# Patient Record
Sex: MALE | Race: WHITE | URBAN - METROPOLITAN AREA
[De-identification: names, ages, dates, MRNs, and addresses within clinical notes are randomized per-mention and may not be internally consistent; named-entity substitution may affect disease eponyms.]

---

## 2024-03-29 ENCOUNTER — OFFICE VISIT (OUTPATIENT)
Age: 44
End: 2024-03-29
Payer: COMMERCIAL

## 2024-03-29 VITALS
HEART RATE: 68 BPM | HEIGHT: 68 IN | OXYGEN SATURATION: 94 % | DIASTOLIC BLOOD PRESSURE: 82 MMHG | SYSTOLIC BLOOD PRESSURE: 136 MMHG | BODY MASS INDEX: 36.53 KG/M2 | WEIGHT: 241 LBS

## 2024-03-29 DIAGNOSIS — H61.93 EARLOBE LESION, BILATERAL: ICD-10-CM

## 2024-03-29 DIAGNOSIS — G47.33 OSA (OBSTRUCTIVE SLEEP APNEA): Primary | ICD-10-CM

## 2024-03-29 DIAGNOSIS — R09.81 NASAL CONGESTION: ICD-10-CM

## 2024-03-29 DIAGNOSIS — J30.9 ALLERGIC RHINITIS, UNSPECIFIED SEASONALITY, UNSPECIFIED TRIGGER: ICD-10-CM

## 2024-03-29 PROCEDURE — 99204 OFFICE O/P NEW MOD 45 MIN: CPT | Performed by: STUDENT IN AN ORGANIZED HEALTH CARE EDUCATION/TRAINING PROGRAM

## 2024-03-29 RX ORDER — TIRZEPATIDE 2.5 MG/.5ML
INJECTION, SOLUTION SUBCUTANEOUS
COMMUNITY
Start: 2024-02-24

## 2024-03-29 RX ORDER — AZELASTINE 1 MG/ML
1 SPRAY, METERED NASAL 2 TIMES DAILY
Qty: 60 ML | Refills: 1 | Status: SHIPPED | OUTPATIENT
Start: 2024-03-29

## 2024-03-29 RX ORDER — NEBIVOLOL 10 MG/1
10 TABLET ORAL DAILY
COMMUNITY
Start: 2024-02-14

## 2024-03-29 RX ORDER — CITALOPRAM 20 MG/1
20 TABLET ORAL DAILY
COMMUNITY

## 2024-03-29 RX ORDER — CARIPRAZINE 3 MG/1
3 CAPSULE, GELATIN COATED ORAL DAILY
COMMUNITY
Start: 2024-03-22

## 2024-03-29 RX ORDER — FLUTICASONE PROPIONATE 50 MCG
2 SPRAY, SUSPENSION (ML) NASAL 2 TIMES DAILY
Qty: 16 G | Refills: 3 | Status: SHIPPED | OUTPATIENT
Start: 2024-03-29

## 2024-03-29 RX ORDER — ALBUTEROL SULFATE 2.5 MG/3ML
2.5 SOLUTION RESPIRATORY (INHALATION) EVERY 4 HOURS PRN
COMMUNITY
Start: 2018-01-08

## 2024-03-29 RX ORDER — LOSARTAN POTASSIUM AND HYDROCHLOROTHIAZIDE 12.5; 5 MG/1; MG/1
1 TABLET ORAL DAILY
COMMUNITY
Start: 2022-02-09

## 2024-03-29 RX ORDER — ROSUVASTATIN CALCIUM 5 MG/1
5 TABLET, COATED ORAL DAILY
COMMUNITY
Start: 2024-02-14

## 2024-03-29 RX ORDER — ONDANSETRON 4 MG/1
TABLET, ORALLY DISINTEGRATING ORAL
COMMUNITY
Start: 2024-02-24

## 2024-03-29 RX ORDER — FLUTICASONE PROPIONATE 50 MCG
1 SPRAY, SUSPENSION (ML) NASAL 2 TIMES DAILY
COMMUNITY
Start: 2021-12-20 | End: 2024-03-29

## 2024-03-29 RX ORDER — BUDESONIDE AND FORMOTEROL FUMARATE DIHYDRATE 160; 4.5 UG/1; UG/1
2 AEROSOL RESPIRATORY (INHALATION) 2 TIMES DAILY
COMMUNITY
Start: 2022-03-07

## 2024-03-29 RX ORDER — TRAZODONE HYDROCHLORIDE 50 MG/1
1 TABLET ORAL
COMMUNITY
Start: 2018-02-16

## 2024-03-29 RX ORDER — METFORMIN HYDROCHLORIDE 500 MG/1
500 TABLET, EXTENDED RELEASE ORAL DAILY
COMMUNITY
Start: 2024-02-24

## 2024-03-29 RX ORDER — AMLODIPINE AND OLMESARTAN MEDOXOMIL 5; 40 MG/1; MG/1
1 TABLET ORAL DAILY
COMMUNITY
Start: 2024-02-14

## 2024-03-29 RX ORDER — HYDROXYZINE PAMOATE 25 MG/1
25 CAPSULE ORAL NIGHTLY PRN
COMMUNITY
Start: 2018-01-08

## 2024-03-29 RX ORDER — CLOTRIMAZOLE AND BETAMETHASONE DIPROPIONATE 10; .64 MG/G; MG/G
CREAM TOPICAL
COMMUNITY
Start: 2024-02-14

## 2024-03-29 NOTE — PROGRESS NOTES
Subjective:   Eleno Escobar   43 y.o.   1980     Refered by: Jose Dumont Iii, Aprn - Np  [address]  [place] [address]     New Patient Visit  Chief Compliant: HEATHER    History of Present Illness:  Eleno Escobar is a 43 y.o. male with past medical history of DM, HTN, HLD, who was referred from Jose Dumont NP, for evaluation of HEATHER.     Patient reports significant daytime fatigue, a.m. headaches, unrestful sleep.  Has tried using CPAP for several years without success.  Patient finds that he is claustrophobic and tends to remove the mask at night.  Does not tolerate it well.      Additional complaint of bilateral nasal congestion for many years now, has tried some nasal spray, he does not know which, without significant benefit.  Has some postnasal drip.  Physical exam reveals septal deviation and inferior turbinate hypertrophy.    Lastly complaining of bilateral ear itching and scabbing.  Patient says that he has some more symptoms in his groin.  Has been using various steroid creams as well as antifungal creams without any significant improvement.     Primary care and ED notes reviewed    Review of Systems  Consitutional: denies fever, excessive weight gain or loss.  Eyes: denies diplopia, eye pain.  Integumentary: denies new concerning skin lesions.  Ears, Nose, Mouth, Throat: denies except as per HPI.  Endocrine: denies hot or cold intolerance, increased thirst.  Respiratory: denies cough, hemoptysis, wheezing  Gastrointestinal: denies trouble swallowing, nausea, emesis, regurgitation  Musculoskeletal: denies muscle weakness or wasting  Cardiovascular: denies chest pain, shortness of breath  Neurologic: denies seizures, numbness or tingling, syncope  Hematologic: denies easy bleeding or bruising       Past Medical History:   Diagnosis Date    Diabetes mellitus (HCC)     Glaucoma     High cholesterol     Hypertension      History reviewed. No pertinent surgical history.

## 2024-05-30 ENCOUNTER — OFFICE VISIT (OUTPATIENT)
Age: 44
End: 2024-05-30
Payer: COMMERCIAL

## 2024-05-30 VITALS
HEIGHT: 68 IN | OXYGEN SATURATION: 98 % | DIASTOLIC BLOOD PRESSURE: 98 MMHG | WEIGHT: 231 LBS | BODY MASS INDEX: 35.01 KG/M2 | HEART RATE: 83 BPM | SYSTOLIC BLOOD PRESSURE: 162 MMHG

## 2024-05-30 DIAGNOSIS — J34.2 NASAL SEPTAL DEVIATION: ICD-10-CM

## 2024-05-30 DIAGNOSIS — G47.33 OSA (OBSTRUCTIVE SLEEP APNEA): Primary | ICD-10-CM

## 2024-05-30 DIAGNOSIS — R09.81 NASAL CONGESTION: ICD-10-CM

## 2024-05-30 DIAGNOSIS — H61.93 EARLOBE LESION, BILATERAL: ICD-10-CM

## 2024-05-30 DIAGNOSIS — J34.3 HYPERTROPHY OF INFERIOR NASAL TURBINATE: ICD-10-CM

## 2024-05-30 DIAGNOSIS — R09.82 POST-NASAL DRIP: ICD-10-CM

## 2024-05-30 PROCEDURE — 99214 OFFICE O/P EST MOD 30 MIN: CPT | Performed by: STUDENT IN AN ORGANIZED HEALTH CARE EDUCATION/TRAINING PROGRAM

## 2024-05-30 RX ORDER — FLUTICASONE PROPIONATE 50 MCG
2 SPRAY, SUSPENSION (ML) NASAL 2 TIMES DAILY
Qty: 16 G | Refills: 3 | Status: SHIPPED | OUTPATIENT
Start: 2024-05-30

## 2024-05-30 RX ORDER — AZELASTINE 1 MG/ML
1 SPRAY, METERED NASAL 2 TIMES DAILY
Qty: 60 ML | Refills: 3 | Status: SHIPPED | OUTPATIENT
Start: 2024-05-30

## 2024-05-30 RX ORDER — DULAGLUTIDE 0.75 MG/.5ML
INJECTION, SOLUTION SUBCUTANEOUS
COMMUNITY
Start: 2024-05-28

## 2024-05-30 NOTE — PROGRESS NOTES
Subjective:   Eleno Escobar   43 y.o.   1980     Refered by: No referring provider defined for this encounter.     New Patient Visit  Chief Compliant: HEATHER    History of Present Illness:  Eleno Escobar is a 43 y.o. male with past medical history of DM, HTN, HLD, who was referred from Jose Dumont NP, for evaluation of HEATHER.     Patient reports significant daytime fatigue, a.m. headaches, unrestful sleep.  Has tried using CPAP for several years without success.  Patient finds that he is claustrophobic and tends to remove the mask at night.  Does not tolerate it well.      Additional complaint of bilateral nasal congestion for many years now, has tried some nasal spray, he does not know which, without significant benefit.  Has some postnasal drip.  Physical exam reveals septal deviation and inferior turbinate hypertrophy.    Lastly complaining of bilateral ear itching and scabbing.  Patient says that he has some more symptoms in his groin.  Has been using various steroid creams as well as antifungal creams without any significant improvement.     Primary care and ED notes reviewed    Interval Hx:   5/30/24:   Sleep study (4/18/24) reviewed and sleep medicine notes reviewed:   AHI = 31.2 (4%), 46.5 (3%), central/mixed apneas 38%, O2 vladislav - unable to be assessed, BMI = 35.12  Sleep study was not of the highest quality due to patient removing leads during the night.    Patient was unable to see [facility], would like a referral to another Derm practice.      Patient's nasal drainage improved with Flonase and azelastine, however congestion is persistent especially overnight.    Review of Systems  Consitutional: denies fever, excessive weight gain or loss.  Eyes: denies diplopia, eye pain.  Integumentary: denies new concerning skin lesions.  Ears, Nose, Mouth, Throat: denies except as per HPI.  Endocrine: denies hot or cold intolerance, increased thirst.  Respiratory: denies cough,

## 2024-12-22 ENCOUNTER — HOSPITAL ENCOUNTER (INPATIENT)
Facility: HOSPITAL | Age: 44
LOS: 3 days | Discharge: HOME | DRG: 871 | End: 2024-12-25
Attending: STUDENT IN AN ORGANIZED HEALTH CARE EDUCATION/TRAINING PROGRAM
Payer: MEDICARE

## 2024-12-22 ENCOUNTER — APPOINTMENT (OUTPATIENT)
Facility: HOSPITAL | Age: 44
DRG: 871 | End: 2024-12-22
Payer: MEDICARE

## 2024-12-22 DIAGNOSIS — E87.20 LACTIC ACIDOSIS: ICD-10-CM

## 2024-12-22 DIAGNOSIS — H91.93 BILATERAL HEARING LOSS, UNSPECIFIED HEARING LOSS TYPE: ICD-10-CM

## 2024-12-22 DIAGNOSIS — R65.21 SEPTIC SHOCK (HCC): Primary | ICD-10-CM

## 2024-12-22 DIAGNOSIS — A41.9 SEPTIC SHOCK (HCC): Primary | ICD-10-CM

## 2024-12-22 DIAGNOSIS — R40.0 SOMNOLENCE: ICD-10-CM

## 2024-12-22 LAB
ALBUMIN SERPL-MCNC: 4 G/DL (ref 3.5–5)
ALBUMIN/GLOB SERPL: 1.2 (ref 1.1–2.2)
ALP SERPL-CCNC: 88 U/L (ref 45–117)
ALT SERPL-CCNC: 1205 U/L (ref 12–78)
ANION GAP SERPL CALC-SCNC: 18 MMOL/L (ref 2–12)
ANION GAP SERPL CALC-SCNC: 8 MMOL/L (ref 2–12)
APAP SERPL-MCNC: <2 UG/ML (ref 10–30)
ARTERIAL PATENCY WRIST A: YES
AST SERPL W P-5'-P-CCNC: 1215 U/L (ref 15–37)
BASE DEFICIT BLD-SCNC: 18.5 MMOL/L
BASE DEFICIT BLD-SCNC: 5.9 MMOL/L
BASE DEFICIT BLDA-SCNC: 21.4 MMOL/L
BASE DEFICIT BLDV-SCNC: 9.8 MMOL/L
BASOPHILS # BLD: 0 K/UL (ref 0–0.1)
BASOPHILS NFR BLD: 0 % (ref 0–1)
BDY SITE: ABNORMAL
BDY SITE: ABNORMAL
BILIRUB SERPL-MCNC: 0.4 MG/DL (ref 0.2–1)
BNP SERPL-MCNC: 84 PG/ML
BODY TEMPERATURE: 97
BODY TEMPERATURE: 98.6
BUN SERPL-MCNC: 29 MG/DL (ref 6–20)
BUN SERPL-MCNC: 30 MG/DL (ref 6–20)
BUN/CREAT SERPL: 10 (ref 12–20)
BUN/CREAT SERPL: 12 (ref 12–20)
CA-I BLD-MCNC: 1.07 MMOL/L (ref 1.12–1.32)
CA-I BLD-MCNC: 1.22 MMOL/L (ref 1.12–1.32)
CA-I BLD-MCNC: 6.7 MG/DL (ref 8.5–10.1)
CA-I BLD-MCNC: 9.4 MG/DL (ref 8.5–10.1)
CHLORIDE BLD-SCNC: 104 MMOL/L (ref 98–107)
CHLORIDE BLD-SCNC: 104 MMOL/L (ref 98–107)
CHLORIDE SERPL-SCNC: 100 MMOL/L (ref 97–108)
CHLORIDE SERPL-SCNC: 109 MMOL/L (ref 97–108)
CK SERPL-CCNC: 355 U/L (ref 39–308)
CO2 BLD-SCNC: 18 MMOL/L
CO2 BLD-SCNC: 25 MMOL/L
CO2 SERPL-SCNC: 18 MMOL/L (ref 21–32)
CO2 SERPL-SCNC: 24 MMOL/L (ref 21–32)
COHGB MFR BLD: 5.5 % (ref 1–2)
CREAT SERPL-MCNC: 2.54 MG/DL (ref 0.7–1.3)
CREAT SERPL-MCNC: 2.82 MG/DL (ref 0.7–1.3)
CREAT UR-MCNC: 2.18 MG/DL (ref 0.6–1.3)
CREAT UR-MCNC: 2.28 MG/DL (ref 0.6–1.3)
DATE LAST DOSE: ABNORMAL
DATE LAST DOSE: NORMAL
DIFFERENTIAL METHOD BLD: ABNORMAL
DOSE AMOUNT: ABNORMAL UNITS
DOSE AMOUNT: NORMAL UNITS
EOSINOPHIL # BLD: 0 K/UL (ref 0–0.4)
EOSINOPHIL NFR BLD: 0 % (ref 0–7)
ERYTHROCYTE [DISTWIDTH] IN BLOOD BY AUTOMATED COUNT: 11.9 % (ref 11.5–14.5)
FIO2 ON VENT: 21 %
FIO2 ON VENT: 36 %
FLUAV RNA SPEC QL NAA+PROBE: NOT DETECTED
FLUBV RNA SPEC QL NAA+PROBE: NOT DETECTED
GLOBULIN SER CALC-MCNC: 3.3 G/DL (ref 2–4)
GLUCOSE BLD STRIP.AUTO-MCNC: 239 MG/DL (ref 65–100)
GLUCOSE BLD STRIP.AUTO-MCNC: 255 MG/DL (ref 65–100)
GLUCOSE BLD STRIP.AUTO-MCNC: 320 MG/DL (ref 65–100)
GLUCOSE BLD STRIP.AUTO-MCNC: 409 MG/DL (ref 65–100)
GLUCOSE SERPL-MCNC: 253 MG/DL (ref 65–100)
GLUCOSE SERPL-MCNC: 386 MG/DL (ref 65–100)
HCO3 BLD-SCNC: 16.6 MMOL/L (ref 19–28)
HCO3 BLD-SCNC: 24.6 MMOL/L (ref 19–28)
HCO3 BLDA-SCNC: 12 MMOL/L (ref 22–26)
HCO3 BLDV-SCNC: 22 MMOL/L (ref 24–25)
HCT VFR BLD AUTO: 52.9 % (ref 36.6–50.3)
HGB BLD-MCNC: 15.8 G/DL (ref 12.1–17)
IMM GRANULOCYTES # BLD AUTO: 0 K/UL
IMM GRANULOCYTES NFR BLD AUTO: 0 %
LACTATE BLD-SCNC: 15.01 MMOL/L (ref 0.4–2)
LACTATE BLD-SCNC: 8.72 MMOL/L (ref 0.4–2)
LYMPHOCYTES # BLD: 2.5 K/UL (ref 0.8–3.5)
LYMPHOCYTES NFR BLD: 11 % (ref 12–49)
MAGNESIUM SERPL-MCNC: 3.3 MG/DL (ref 1.6–2.4)
MCH RBC QN AUTO: 30.6 PG (ref 26–34)
MCHC RBC AUTO-ENTMCNC: 29.9 G/DL (ref 30–36.5)
MCV RBC AUTO: 102.5 FL (ref 80–99)
METHGB MFR BLD: 0.2 % (ref 0–1.4)
MONOCYTES # BLD: 0.9 K/UL (ref 0–1)
MONOCYTES NFR BLD: 4 % (ref 5–13)
MRSA DNA SPEC QL NAA+PROBE: NOT DETECTED
NEUTS BAND NFR BLD MANUAL: 2 % (ref 0–6)
NEUTS SEG # BLD: 19.6 K/UL (ref 1.8–8)
NEUTS SEG NFR BLD: 83 % (ref 32–75)
NRBC # BLD: 0.02 K/UL (ref 0–0.01)
NRBC BLD-RTO: 0.1 PER 100 WBC
OXYHGB MFR BLD: 82 % (ref 95–99)
PCO2 BLD: 72.2 MMHG (ref 35–45)
PCO2 BLD: 84.5 MMHG (ref 35–45)
PCO2 BLDA: 57 MMHG (ref 35–45)
PCO2 BLDV: 73.7 MMHG (ref 41–51)
PERFORMED BY:: ABNORMAL
PH BLD: 6.9 (ref 7.35–7.45)
PH BLD: 7.14 (ref 7.35–7.45)
PH BLDA: 6.93 (ref 7.35–7.45)
PH BLDV: 7.09 (ref 7.32–7.42)
PLATELET # BLD AUTO: 208 K/UL (ref 150–400)
PMV BLD AUTO: 11.2 FL (ref 8.9–12.9)
PO2 BLD: 30 MMHG (ref 75–100)
PO2 BLD: <27 MMHG (ref 75–100)
PO2 BLDA: 62 MMHG (ref 80–100)
PO2 BLDV: 41 MMHG (ref 25–40)
POTASSIUM BLD-SCNC: 5 MMOL/L (ref 3.5–5.5)
POTASSIUM BLD-SCNC: 6.1 MMOL/L (ref 3.5–5.5)
POTASSIUM SERPL-SCNC: 5.1 MMOL/L (ref 3.5–5.1)
POTASSIUM SERPL-SCNC: 5.7 MMOL/L (ref 3.5–5.1)
POTASSIUM SERPL-SCNC: ABNORMAL MMOL/L (ref 3.5–5.1)
PROCALCITONIN SERPL-MCNC: 1.53 NG/ML
PROT SERPL-MCNC: 7.3 G/DL (ref 6.4–8.2)
RBC # BLD AUTO: 5.16 M/UL (ref 4.1–5.7)
RBC MORPH BLD: ABNORMAL
RSV BY NAA: NOT DETECTED
SALICYLATES SERPL-MCNC: 5.1 MG/DL (ref 2.8–20)
SAO2 % BLD: 39 %
SAO2 % BLD: 87 % (ref 95–99)
SAO2% DEVICE SAO2% SENSOR NAME: ABNORMAL
SAO2% DEVICE SAO2% SENSOR NAME: ABNORMAL
SARS-COV-2 RNA RESP QL NAA+PROBE: NOT DETECTED
SERVICE CMNT-IMP: ABNORMAL
SODIUM BLD-SCNC: 137 MMOL/L (ref 136–145)
SODIUM BLD-SCNC: 139 MMOL/L (ref 136–145)
SODIUM SERPL-SCNC: 136 MMOL/L (ref 136–145)
SODIUM SERPL-SCNC: 141 MMOL/L (ref 136–145)
SPECIMEN SITE: ABNORMAL
SPECIMEN SOURCE: NORMAL
TROPONIN I SERPL HS-MCNC: 33 NG/L (ref 0–76)
WBC # BLD AUTO: 23 K/UL (ref 4.1–11.1)

## 2024-12-22 PROCEDURE — 83880 ASSAY OF NATRIURETIC PEPTIDE: CPT

## 2024-12-22 PROCEDURE — 2580000003 HC RX 258: Performed by: STUDENT IN AN ORGANIZED HEALTH CARE EDUCATION/TRAINING PROGRAM

## 2024-12-22 PROCEDURE — 6370000000 HC RX 637 (ALT 250 FOR IP): Performed by: INTERNAL MEDICINE

## 2024-12-22 PROCEDURE — 99291 CRITICAL CARE FIRST HOUR: CPT

## 2024-12-22 PROCEDURE — 71275 CT ANGIOGRAPHY CHEST: CPT

## 2024-12-22 PROCEDURE — 6360000002 HC RX W HCPCS: Performed by: INTERNAL MEDICINE

## 2024-12-22 PROCEDURE — 6360000002 HC RX W HCPCS: Performed by: STUDENT IN AN ORGANIZED HEALTH CARE EDUCATION/TRAINING PROGRAM

## 2024-12-22 PROCEDURE — 74177 CT ABD & PELVIS W/CONTRAST: CPT

## 2024-12-22 PROCEDURE — 80143 DRUG ASSAY ACETAMINOPHEN: CPT

## 2024-12-22 PROCEDURE — 71045 X-RAY EXAM CHEST 1 VIEW: CPT

## 2024-12-22 PROCEDURE — 80179 DRUG ASSAY SALICYLATE: CPT

## 2024-12-22 PROCEDURE — 2580000003 HC RX 258: Performed by: INTERNAL MEDICINE

## 2024-12-22 PROCEDURE — 82947 ASSAY GLUCOSE BLOOD QUANT: CPT

## 2024-12-22 PROCEDURE — 70450 CT HEAD/BRAIN W/O DYE: CPT

## 2024-12-22 PROCEDURE — 84484 ASSAY OF TROPONIN QUANT: CPT

## 2024-12-22 PROCEDURE — 83605 ASSAY OF LACTIC ACID: CPT

## 2024-12-22 PROCEDURE — 84145 PROCALCITONIN (PCT): CPT

## 2024-12-22 PROCEDURE — 84295 ASSAY OF SERUM SODIUM: CPT

## 2024-12-22 PROCEDURE — 2500000003 HC RX 250 WO HCPCS: Performed by: NURSE PRACTITIONER

## 2024-12-22 PROCEDURE — 80048 BASIC METABOLIC PNL TOTAL CA: CPT

## 2024-12-22 PROCEDURE — 00JU3ZZ INSPECTION OF SPINAL CANAL, PERCUTANEOUS APPROACH: ICD-10-PCS | Performed by: NURSE PRACTITIONER

## 2024-12-22 PROCEDURE — 87636 SARSCOV2 & INF A&B AMP PRB: CPT

## 2024-12-22 PROCEDURE — 2500000003 HC RX 250 WO HCPCS: Performed by: STUDENT IN AN ORGANIZED HEALTH CARE EDUCATION/TRAINING PROGRAM

## 2024-12-22 PROCEDURE — 82962 GLUCOSE BLOOD TEST: CPT

## 2024-12-22 PROCEDURE — 2500000003 HC RX 250 WO HCPCS: Performed by: INTERNAL MEDICINE

## 2024-12-22 PROCEDURE — 82803 BLOOD GASES ANY COMBINATION: CPT

## 2024-12-22 PROCEDURE — 85025 COMPLETE CBC W/AUTO DIFF WBC: CPT

## 2024-12-22 PROCEDURE — 87040 BLOOD CULTURE FOR BACTERIA: CPT

## 2024-12-22 PROCEDURE — 94660 CPAP INITIATION&MGMT: CPT

## 2024-12-22 PROCEDURE — 83735 ASSAY OF MAGNESIUM: CPT

## 2024-12-22 PROCEDURE — 82330 ASSAY OF CALCIUM: CPT

## 2024-12-22 PROCEDURE — 82550 ASSAY OF CK (CPK): CPT

## 2024-12-22 PROCEDURE — 84132 ASSAY OF SERUM POTASSIUM: CPT

## 2024-12-22 PROCEDURE — 87641 MR-STAPH DNA AMP PROBE: CPT

## 2024-12-22 PROCEDURE — 94640 AIRWAY INHALATION TREATMENT: CPT

## 2024-12-22 PROCEDURE — 6360000002 HC RX W HCPCS: Performed by: NURSE PRACTITIONER

## 2024-12-22 PROCEDURE — 62270 DX LMBR SPI PNXR: CPT

## 2024-12-22 PROCEDURE — 6360000004 HC RX CONTRAST MEDICATION: Performed by: INTERNAL MEDICINE

## 2024-12-22 PROCEDURE — 2000000000 HC ICU R&B

## 2024-12-22 PROCEDURE — 2700000000 HC OXYGEN THERAPY PER DAY

## 2024-12-22 PROCEDURE — 36600 WITHDRAWAL OF ARTERIAL BLOOD: CPT

## 2024-12-22 PROCEDURE — 80053 COMPREHEN METABOLIC PANEL: CPT

## 2024-12-22 PROCEDURE — 93005 ELECTROCARDIOGRAM TRACING: CPT | Performed by: STUDENT IN AN ORGANIZED HEALTH CARE EDUCATION/TRAINING PROGRAM

## 2024-12-22 PROCEDURE — 009U3ZX DRAINAGE OF SPINAL CANAL, PERCUTANEOUS APPROACH, DIAGNOSTIC: ICD-10-PCS | Performed by: NURSE PRACTITIONER

## 2024-12-22 PROCEDURE — 96374 THER/PROPH/DIAG INJ IV PUSH: CPT

## 2024-12-22 PROCEDURE — 87634 RSV DNA/RNA AMP PROBE: CPT

## 2024-12-22 RX ORDER — 0.9 % SODIUM CHLORIDE 0.9 %
30 INTRAVENOUS SOLUTION INTRAVENOUS ONCE
Status: COMPLETED | OUTPATIENT
Start: 2024-12-22 | End: 2024-12-22

## 2024-12-22 RX ORDER — DEXMEDETOMIDINE HYDROCHLORIDE 4 UG/ML
.1-1.5 INJECTION, SOLUTION INTRAVENOUS CONTINUOUS
Status: DISCONTINUED | OUTPATIENT
Start: 2024-12-22 | End: 2024-12-23

## 2024-12-22 RX ORDER — IOPAMIDOL 755 MG/ML
100 INJECTION, SOLUTION INTRAVASCULAR
Status: COMPLETED | OUTPATIENT
Start: 2024-12-22 | End: 2024-12-22

## 2024-12-22 RX ORDER — BUDESONIDE 0.5 MG/2ML
0.5 INHALANT ORAL
Status: DISCONTINUED | OUTPATIENT
Start: 2024-12-22 | End: 2024-12-25 | Stop reason: HOSPADM

## 2024-12-22 RX ORDER — ENOXAPARIN SODIUM 100 MG/ML
30 INJECTION SUBCUTANEOUS 2 TIMES DAILY
Status: DISCONTINUED | OUTPATIENT
Start: 2024-12-22 | End: 2024-12-23

## 2024-12-22 RX ORDER — SODIUM CHLORIDE 0.9 % (FLUSH) 0.9 %
5-40 SYRINGE (ML) INJECTION EVERY 12 HOURS SCHEDULED
Status: DISCONTINUED | OUTPATIENT
Start: 2024-12-22 | End: 2024-12-25 | Stop reason: HOSPADM

## 2024-12-22 RX ORDER — 0.9 % SODIUM CHLORIDE 0.9 %
500 INTRAVENOUS SOLUTION INTRAVENOUS ONCE
Status: COMPLETED | OUTPATIENT
Start: 2024-12-22 | End: 2024-12-22

## 2024-12-22 RX ORDER — SODIUM CHLORIDE 9 MG/ML
INJECTION, SOLUTION INTRAVENOUS PRN
Status: DISCONTINUED | OUTPATIENT
Start: 2024-12-22 | End: 2024-12-25 | Stop reason: HOSPADM

## 2024-12-22 RX ORDER — CALCIUM GLUCONATE 94 MG/ML
1000 INJECTION, SOLUTION INTRAVENOUS ONCE
Status: COMPLETED | OUTPATIENT
Start: 2024-12-22 | End: 2024-12-22

## 2024-12-22 RX ORDER — SODIUM CHLORIDE 9 MG/ML
INJECTION, SOLUTION INTRAVENOUS PRN
Status: DISCONTINUED | OUTPATIENT
Start: 2024-12-22 | End: 2024-12-23 | Stop reason: SDUPTHER

## 2024-12-22 RX ORDER — IPRATROPIUM BROMIDE AND ALBUTEROL SULFATE 2.5; .5 MG/3ML; MG/3ML
1 SOLUTION RESPIRATORY (INHALATION)
Status: DISCONTINUED | OUTPATIENT
Start: 2024-12-22 | End: 2024-12-22

## 2024-12-22 RX ORDER — INSULIN LISPRO 100 [IU]/ML
0-8 INJECTION, SOLUTION INTRAVENOUS; SUBCUTANEOUS EVERY 6 HOURS SCHEDULED
Status: DISCONTINUED | OUTPATIENT
Start: 2024-12-22 | End: 2024-12-25 | Stop reason: HOSPADM

## 2024-12-22 RX ORDER — GLUCAGON 1 MG/ML
1 KIT INJECTION PRN
Status: DISCONTINUED | OUTPATIENT
Start: 2024-12-22 | End: 2024-12-23 | Stop reason: SDUPTHER

## 2024-12-22 RX ORDER — NOREPINEPHRINE BITARTRATE 0.06 MG/ML
1-100 INJECTION, SOLUTION INTRAVENOUS CONTINUOUS
Status: DISCONTINUED | OUTPATIENT
Start: 2024-12-22 | End: 2024-12-23

## 2024-12-22 RX ORDER — POTASSIUM CHLORIDE 7.45 MG/ML
10 INJECTION INTRAVENOUS PRN
Status: DISCONTINUED | OUTPATIENT
Start: 2024-12-22 | End: 2024-12-25 | Stop reason: HOSPADM

## 2024-12-22 RX ORDER — POLYETHYLENE GLYCOL 3350 17 G/17G
17 POWDER, FOR SOLUTION ORAL DAILY PRN
Status: DISCONTINUED | OUTPATIENT
Start: 2024-12-22 | End: 2024-12-25 | Stop reason: HOSPADM

## 2024-12-22 RX ORDER — SODIUM CHLORIDE 0.9 % (FLUSH) 0.9 %
5-40 SYRINGE (ML) INJECTION PRN
Status: DISCONTINUED | OUTPATIENT
Start: 2024-12-22 | End: 2024-12-25 | Stop reason: HOSPADM

## 2024-12-22 RX ORDER — IPRATROPIUM BROMIDE AND ALBUTEROL SULFATE 2.5; .5 MG/3ML; MG/3ML
1 SOLUTION RESPIRATORY (INHALATION)
Status: DISCONTINUED | OUTPATIENT
Start: 2024-12-22 | End: 2024-12-25 | Stop reason: HOSPADM

## 2024-12-22 RX ORDER — IPRATROPIUM BROMIDE AND ALBUTEROL SULFATE 2.5; .5 MG/3ML; MG/3ML
1 SOLUTION RESPIRATORY (INHALATION) EVERY 4 HOURS PRN
Status: DISCONTINUED | OUTPATIENT
Start: 2024-12-22 | End: 2024-12-25 | Stop reason: HOSPADM

## 2024-12-22 RX ORDER — ACETAMINOPHEN 650 MG/1
650 SUPPOSITORY RECTAL EVERY 6 HOURS PRN
Status: DISCONTINUED | OUTPATIENT
Start: 2024-12-22 | End: 2024-12-25 | Stop reason: HOSPADM

## 2024-12-22 RX ORDER — ONDANSETRON 2 MG/ML
4 INJECTION INTRAMUSCULAR; INTRAVENOUS EVERY 6 HOURS PRN
Status: DISCONTINUED | OUTPATIENT
Start: 2024-12-22 | End: 2024-12-25 | Stop reason: HOSPADM

## 2024-12-22 RX ORDER — DEXTROSE MONOHYDRATE 100 MG/ML
INJECTION, SOLUTION INTRAVENOUS CONTINUOUS PRN
Status: DISCONTINUED | OUTPATIENT
Start: 2024-12-22 | End: 2024-12-23 | Stop reason: SDUPTHER

## 2024-12-22 RX ORDER — ONDANSETRON 4 MG/1
4 TABLET, ORALLY DISINTEGRATING ORAL EVERY 8 HOURS PRN
Status: DISCONTINUED | OUTPATIENT
Start: 2024-12-22 | End: 2024-12-25 | Stop reason: HOSPADM

## 2024-12-22 RX ORDER — ACETAMINOPHEN 325 MG/1
650 TABLET ORAL EVERY 6 HOURS PRN
Status: DISCONTINUED | OUTPATIENT
Start: 2024-12-22 | End: 2024-12-25 | Stop reason: HOSPADM

## 2024-12-22 RX ORDER — MAGNESIUM SULFATE IN WATER 40 MG/ML
2000 INJECTION, SOLUTION INTRAVENOUS PRN
Status: DISCONTINUED | OUTPATIENT
Start: 2024-12-22 | End: 2024-12-25 | Stop reason: HOSPADM

## 2024-12-22 RX ORDER — POTASSIUM CHLORIDE 29.8 MG/ML
20 INJECTION INTRAVENOUS PRN
Status: DISCONTINUED | OUTPATIENT
Start: 2024-12-22 | End: 2024-12-25 | Stop reason: HOSPADM

## 2024-12-22 RX ORDER — SODIUM CHLORIDE 0.9 % (FLUSH) 0.9 %
5-40 SYRINGE (ML) INJECTION EVERY 12 HOURS SCHEDULED
Status: DISCONTINUED | OUTPATIENT
Start: 2024-12-22 | End: 2024-12-23 | Stop reason: SDUPTHER

## 2024-12-22 RX ORDER — SODIUM CHLORIDE 0.9 % (FLUSH) 0.9 %
5-40 SYRINGE (ML) INJECTION PRN
Status: DISCONTINUED | OUTPATIENT
Start: 2024-12-22 | End: 2024-12-23 | Stop reason: SDUPTHER

## 2024-12-22 RX ADMIN — VANCOMYCIN HYDROCHLORIDE 2500 MG: 1.25 INJECTION, POWDER, LYOPHILIZED, FOR SOLUTION INTRAVENOUS at 15:59

## 2024-12-22 RX ADMIN — SODIUM BICARBONATE 50 MEQ: 84 INJECTION, SOLUTION INTRAVENOUS at 18:58

## 2024-12-22 RX ADMIN — SODIUM CHLORIDE, PRESERVATIVE FREE 10 ML: 5 INJECTION INTRAVENOUS at 16:19

## 2024-12-22 RX ADMIN — SODIUM CHLORIDE 3144 ML: 9 INJECTION, SOLUTION INTRAVENOUS at 15:10

## 2024-12-22 RX ADMIN — INSULIN LISPRO 4 UNITS: 100 INJECTION, SOLUTION INTRAVENOUS; SUBCUTANEOUS at 18:58

## 2024-12-22 RX ADMIN — SODIUM CHLORIDE, PRESERVATIVE FREE 10 ML: 5 INJECTION INTRAVENOUS at 20:51

## 2024-12-22 RX ADMIN — WATER 2000 MG: 1 INJECTION INTRAMUSCULAR; INTRAVENOUS; SUBCUTANEOUS at 15:00

## 2024-12-22 RX ADMIN — Medication 5 MCG/MIN: at 15:58

## 2024-12-22 RX ADMIN — CALCIUM GLUCONATE 1000 MG: 98 INJECTION, SOLUTION INTRAVENOUS at 23:38

## 2024-12-22 RX ADMIN — SODIUM CHLORIDE, PRESERVATIVE FREE 10 ML: 5 INJECTION INTRAVENOUS at 20:50

## 2024-12-22 RX ADMIN — IPRATROPIUM BROMIDE AND ALBUTEROL SULFATE 1 DOSE: 2.5; .5 SOLUTION RESPIRATORY (INHALATION) at 22:01

## 2024-12-22 RX ADMIN — SODIUM CHLORIDE 500 ML: 9 INJECTION, SOLUTION INTRAVENOUS at 19:13

## 2024-12-22 RX ADMIN — WATER 2000 MG: 1 INJECTION INTRAMUSCULAR; INTRAVENOUS; SUBCUTANEOUS at 19:21

## 2024-12-22 RX ADMIN — SODIUM BICARBONATE 100 MEQ: 84 INJECTION, SOLUTION INTRAVENOUS at 16:33

## 2024-12-22 RX ADMIN — ENOXAPARIN SODIUM 30 MG: 100 INJECTION SUBCUTANEOUS at 20:50

## 2024-12-22 RX ADMIN — DEXMEDETOMIDINE HYDROCHLORIDE 1 MCG/KG/HR: 4 INJECTION, SOLUTION INTRAVENOUS at 21:42

## 2024-12-22 RX ADMIN — SODIUM BICARBONATE: 84 INJECTION, SOLUTION INTRAVENOUS at 19:39

## 2024-12-22 RX ADMIN — BUDESONIDE 500 MCG: 0.5 SUSPENSION RESPIRATORY (INHALATION) at 22:01

## 2024-12-22 RX ADMIN — IOPAMIDOL 100 ML: 755 INJECTION, SOLUTION INTRAVENOUS at 18:06

## 2024-12-22 ASSESSMENT — PAIN SCALES - GENERAL
PAINLEVEL_OUTOF10: 0

## 2024-12-22 ASSESSMENT — PAIN - FUNCTIONAL ASSESSMENT
PAIN_FUNCTIONAL_ASSESSMENT: NONE - DENIES PAIN
PAIN_FUNCTIONAL_ASSESSMENT: NONE - DENIES PAIN

## 2024-12-22 ASSESSMENT — LIFESTYLE VARIABLES
HOW MANY STANDARD DRINKS CONTAINING ALCOHOL DO YOU HAVE ON A TYPICAL DAY: PATIENT DOES NOT DRINK
HOW OFTEN DO YOU HAVE A DRINK CONTAINING ALCOHOL: NEVER

## 2024-12-22 NOTE — RT PROTOCOL NOTE
RT Inhaler-Nebulizer Bronchodilator Protocol Note    There is a bronchodilator order in the chart from a provider indicating to follow the RT Bronchodilator Protocol and there is an “Initiate RT Inhaler-Nebulizer Bronchodilator Protocol” order as well (see protocol at bottom of note).    CXR Findings:  XR CHEST PORTABLE    Result Date: 12/22/2024  No acute cardiopulmonary disease.  Electronically signed by Refugio Thurman MD      The findings from the last RT Protocol Assessment were as follows:   History Pulmonary Disease: Chronic pulmonary disease  Respiratory Pattern: Regular pattern and RR 12-20 bpm  Breath Sounds: Slightly diminished and/or crackles  Cough: Strong, spontaneous, non-productive  Indication for Bronchodilator Therapy:    Bronchodilator Assessment Score: 4    Aerosolized bronchodilator medication orders have been revised according to the RT Inhaler-Nebulizer Bronchodilator Protocol below.    Respiratory Therapist to perform RT Therapy Protocol Assessment initially then follow the protocol.  Repeat RT Therapy Protocol Assessment PRN for score 0-3 or on second treatment, BID, and PRN for scores above 3.    No Indications - adjust the frequency to every 6 hours PRN wheezing or bronchospasm, if no treatments needed after 48 hours then discontinue using Per Protocol order mode.     If indication present, adjust the RT bronchodilator orders based on the Bronchodilator Assessment Score as indicated below.  Use Inhaler orders unless patient has one or more of the following: on home nebulizer, not able to hold breath for 10 seconds, is not alert and oriented, cannot activate and use MDI correctly, or respiratory rate 25 breaths per minute or more, then use the equivalent nebulizer order(s) with same Frequency and PRN reasons based on the score.  If a patient is on this medication at home then do not decrease Frequency below that used at home.    0-3 - enter or revise RT bronchodilator order(s) to equivalent RT

## 2024-12-22 NOTE — ED TRIAGE NOTES
AMS reported after waking up from a 12-13 hour sleep. Pt was sleeping with his girlfriend in front of a wall AC unit during his sleep. EMS reported pt was confused and possibly hypothermic. Blood glucose was 305- pt is a reported diabetic. Pt did not respond to substance use.

## 2024-12-22 NOTE — PROGRESS NOTES
Cefepime Extended-Infusion Dosing/Monitoring  Current regimen:  1 g every 8 hours    Recent Labs     12/22/24  1438 12/22/24  1440 12/22/24  1652   CREATININE 2.82* 2.18* 2.28*   BUN 29*  --   --        Estimated CrCl:  49 mL/min    Plan: Change to Cefepime 2g q12h per [facility] P&T Committee Protocol with respect to intermittent-infusion ?-lactam antibiotics and fluid conservation protocol due to [place] shortage.   Primary team to keep in mind the below table and adjust dose based on improving renal function throughout inpatient stay.

## 2024-12-22 NOTE — H&P
procedural time which has been billed separately.    Tremaine Harris DO  [facility]  12/22/2024

## 2024-12-22 NOTE — PROGRESS NOTES
Vancomycin Dosing Consult  Eleno Escobar is a 44 y.o. male with sepsis. Pharmacy was consulted by Dr. Harris to dose and monitor vancomycin. Today is day 1.    Antibiotic regimen: Vancomycin + Cefepime    Temp (24hrs), Av.9 °F (33.8 °C), Min:91.5 °F (33.1 °C), Max:94.2 °F (34.6 °C)    Recent Labs     24  1438 24  1440 24  1652   WBC 23.0*  --   --    CREATININE 2.82* 2.18* 2.28*   BUN 29*  --   --      Est CrCl: 49 mL/min  Concomitant nephrotoxic drugs: Vasopressors    Cultures:    Blood x 2: pending    MRSA Swab: Pending    Target range: Re-dose for random level <15 mcg/mL    Recent level history:  Date/Time Dose & Interval Measured Level (mcg/mL) Associated AUC/GUILLE              Assessment/Plan:   LD Vancomycin IV 2500 mg x 1 given  Scr appears elevated. Unable to determine baseline Scr, will pulse dose for now.  Level scheduled for  @0800  CMP ordered through   Antimicrobial stop date 7 days per consult

## 2024-12-23 ENCOUNTER — APPOINTMENT (OUTPATIENT)
Facility: HOSPITAL | Age: 44
DRG: 871 | End: 2024-12-23
Payer: MEDICARE

## 2024-12-23 LAB
ALBUMIN SERPL-MCNC: 3.4 G/DL (ref 3.5–5)
ALBUMIN/GLOB SERPL: 1.4 (ref 1.1–2.2)
ALP SERPL-CCNC: 50 U/L (ref 45–117)
ALT SERPL-CCNC: 1267 U/L (ref 12–78)
AMPHET UR QL SCN: NEGATIVE
ANION GAP SERPL CALC-SCNC: 5 MMOL/L (ref 2–12)
ANION GAP SERPL CALC-SCNC: 5 MMOL/L (ref 2–12)
ANION GAP SERPL CALC-SCNC: 6 MMOL/L (ref 2–12)
APPEARANCE UR: ABNORMAL
ARTERIAL PATENCY WRIST A: ABNORMAL
ARTERIAL PATENCY WRIST A: ABNORMAL
AST SERPL W P-5'-P-CCNC: 1458 U/L (ref 15–37)
BACTERIA URNS QL MICRO: NEGATIVE /HPF
BARBITURATES UR QL SCN: NEGATIVE
BASE DEFICIT BLDA-SCNC: 2.1 MMOL/L
BASE DEFICIT BLDA-SCNC: 9.2 MMOL/L
BDY SITE: ABNORMAL
BDY SITE: ABNORMAL
BENZODIAZ UR QL: NEGATIVE
BILIRUB SERPL-MCNC: 0.4 MG/DL (ref 0.2–1)
BILIRUB UR QL: NEGATIVE
BODY TEMPERATURE: 101.2
BODY TEMPERATURE: 99.7
BUN SERPL-MCNC: 31 MG/DL (ref 6–20)
BUN SERPL-MCNC: 32 MG/DL (ref 6–20)
BUN SERPL-MCNC: 34 MG/DL (ref 6–20)
BUN/CREAT SERPL: 13 (ref 12–20)
BUN/CREAT SERPL: 16 (ref 12–20)
BUN/CREAT SERPL: 17 (ref 12–20)
CA-I BLD-MCNC: 1.01 MMOL/L (ref 1.13–1.32)
CA-I BLD-MCNC: 1.07 MMOL/L (ref 1.13–1.32)
CA-I BLD-MCNC: 6.6 MG/DL (ref 8.5–10.1)
CA-I BLD-MCNC: 7.5 MG/DL (ref 8.5–10.1)
CA-I BLD-MCNC: 8.1 MG/DL (ref 8.5–10.1)
CANNABINOIDS UR QL SCN: POSITIVE
CHLORIDE SERPL-SCNC: 106 MMOL/L (ref 97–108)
CHLORIDE SERPL-SCNC: 107 MMOL/L (ref 97–108)
CHLORIDE SERPL-SCNC: 109 MMOL/L (ref 97–108)
CO2 SERPL-SCNC: 23 MMOL/L (ref 21–32)
CO2 SERPL-SCNC: 26 MMOL/L (ref 21–32)
CO2 SERPL-SCNC: 29 MMOL/L (ref 21–32)
COCAINE UR QL SCN: POSITIVE
COHGB MFR BLD: 1.2 % (ref 1–2)
COHGB MFR BLD: 1.6 % (ref 1–2)
COLOR UR: ABNORMAL
CREAT SERPL-MCNC: 1.84 MG/DL (ref 0.7–1.3)
CREAT SERPL-MCNC: 2.05 MG/DL (ref 0.7–1.3)
CREAT SERPL-MCNC: 2.66 MG/DL (ref 0.7–1.3)
DATE LAST DOSE: NORMAL
DOSE AMOUNT: NORMAL UNITS
EKG ATRIAL RATE: 241 BPM
EKG DIAGNOSIS: NORMAL
EKG Q-T INTERVAL: 344 MS
EKG QRS DURATION: 84 MS
EKG QTC CALCULATION (BAZETT): 434 MS
EKG R AXIS: 73 DEGREES
EKG T AXIS: 1 DEGREES
EKG VENTRICULAR RATE: 96 BPM
EPITH CASTS URNS QL MICRO: ABNORMAL /LPF
ERYTHROCYTE [DISTWIDTH] IN BLOOD BY AUTOMATED COUNT: 12.4 % (ref 11.5–14.5)
EST. AVERAGE GLUCOSE BLD GHB EST-MCNC: 111 MG/DL
FIO2 ON VENT: 25 %
FIO2 ON VENT: 30 %
GLOBULIN SER CALC-MCNC: 2.5 G/DL (ref 2–4)
GLUCOSE BLD STRIP.AUTO-MCNC: 102 MG/DL (ref 65–100)
GLUCOSE BLD STRIP.AUTO-MCNC: 183 MG/DL (ref 65–100)
GLUCOSE BLD STRIP.AUTO-MCNC: 203 MG/DL (ref 65–100)
GLUCOSE BLD STRIP.AUTO-MCNC: 206 MG/DL (ref 65–100)
GLUCOSE BLD STRIP.AUTO-MCNC: 273 MG/DL (ref 65–100)
GLUCOSE BLD STRIP.AUTO-MCNC: 92 MG/DL (ref 65–100)
GLUCOSE BLD STRIP.AUTO-MCNC: 99 MG/DL (ref 65–100)
GLUCOSE SERPL-MCNC: 130 MG/DL (ref 65–100)
GLUCOSE SERPL-MCNC: 190 MG/DL (ref 65–100)
GLUCOSE SERPL-MCNC: 240 MG/DL (ref 65–100)
GLUCOSE UR STRIP.AUTO-MCNC: 50 MG/DL
HBA1C MFR BLD: 5.5 % (ref 4–5.6)
HCO3 BLDA-SCNC: 19 MMOL/L (ref 22–26)
HCO3 BLDA-SCNC: 25 MMOL/L (ref 22–26)
HCT VFR BLD AUTO: 44.5 % (ref 36.6–50.3)
HGB BLD-MCNC: 14.4 G/DL (ref 12.1–17)
HGB UR QL STRIP: ABNORMAL
HYALINE CASTS URNS QL MICRO: >20 /LPF (ref 0–5)
IPAP/PIP: 16
IPAP/PIP: 16
KETONES UR QL STRIP.AUTO: 5 MG/DL
LACTATE SERPL-SCNC: 1.7 MMOL/L (ref 0.4–2)
LACTATE SERPL-SCNC: 2.5 MMOL/L (ref 0.4–2)
LACTATE SERPL-SCNC: 2.9 MMOL/L (ref 0.4–2)
LACTATE SERPL-SCNC: 3.3 MMOL/L (ref 0.4–2)
LEUKOCYTE ESTERASE UR QL STRIP.AUTO: NEGATIVE
Lab: ABNORMAL
MAGNESIUM SERPL-MCNC: 2.1 MG/DL (ref 1.6–2.4)
MCH RBC QN AUTO: 30.4 PG (ref 26–34)
MCHC RBC AUTO-ENTMCNC: 32.4 G/DL (ref 30–36.5)
MCV RBC AUTO: 93.9 FL (ref 80–99)
METHADONE UR QL: NEGATIVE
METHGB MFR BLD: 0.3 % (ref 0–1.4)
METHGB MFR BLD: 0.3 % (ref 0–1.4)
MUCOUS THREADS URNS QL MICRO: ABNORMAL /LPF
NITRITE UR QL STRIP.AUTO: NEGATIVE
NRBC # BLD: 0 K/UL (ref 0–0.01)
NRBC BLD-RTO: 0 PER 100 WBC
OPIATES UR QL: NEGATIVE
OXYHGB MFR BLD: 94.9 % (ref 95–99)
OXYHGB MFR BLD: 95.1 % (ref 95–99)
PCO2 BLDA: 52 MMHG (ref 35–45)
PCO2 BLDA: 55 MMHG (ref 35–45)
PCP UR QL: NEGATIVE
PEEP RESPIRATORY: 6
PEEP RESPIRATORY: 6
PERFORMED BY:: ABNORMAL
PERFORMED BY:: NORMAL
PERFORMED BY:: NORMAL
PH BLDA: 7.19 (ref 7.35–7.45)
PH BLDA: 7.28 (ref 7.35–7.45)
PH UR STRIP: 5 (ref 5–8)
PHOSPHATE SERPL-MCNC: 5.4 MG/DL (ref 2.6–4.7)
PLATELET # BLD AUTO: 155 K/UL (ref 150–400)
PMV BLD AUTO: 12 FL (ref 8.9–12.9)
PO2 BLDA: 109 MMHG (ref 80–100)
PO2 BLDA: 96 MMHG (ref 80–100)
POTASSIUM SERPL-SCNC: 3.9 MMOL/L (ref 3.5–5.1)
POTASSIUM SERPL-SCNC: 4.2 MMOL/L (ref 3.5–5.1)
POTASSIUM SERPL-SCNC: 6.4 MMOL/L (ref 3.5–5.1)
PROT SERPL-MCNC: 5.9 G/DL (ref 6.4–8.2)
PROT UR STRIP-MCNC: 100 MG/DL
RBC # BLD AUTO: 4.74 M/UL (ref 4.1–5.7)
RBC #/AREA URNS HPF: ABNORMAL /HPF (ref 0–5)
SAO2 % BLD: 96 % (ref 95–99)
SAO2 % BLD: 97 % (ref 95–99)
SAO2% DEVICE SAO2% SENSOR NAME: ABNORMAL
SAO2% DEVICE SAO2% SENSOR NAME: ABNORMAL
SERVICE CMNT-IMP: ABNORMAL
SODIUM SERPL-SCNC: 138 MMOL/L (ref 136–145)
SODIUM SERPL-SCNC: 138 MMOL/L (ref 136–145)
SODIUM SERPL-SCNC: 140 MMOL/L (ref 136–145)
SP GR UR REFRACTOMETRY: >1.03 (ref 1–1.03)
SPECIMEN SITE: ABNORMAL
SPECIMEN SITE: ABNORMAL
URINE CULTURE IF INDICATED: ABNORMAL
UROBILINOGEN UR QL STRIP.AUTO: 0.1 EU/DL (ref 0.1–1)
VANCOMYCIN SERPL-MCNC: 20.8 UG/ML
WBC # BLD AUTO: 21.5 K/UL (ref 4.1–11.1)
WBC URNS QL MICRO: ABNORMAL /HPF (ref 0–4)

## 2024-12-23 PROCEDURE — 6360000002 HC RX W HCPCS: Performed by: NURSE PRACTITIONER

## 2024-12-23 PROCEDURE — 82962 GLUCOSE BLOOD TEST: CPT

## 2024-12-23 PROCEDURE — 84100 ASSAY OF PHOSPHORUS: CPT

## 2024-12-23 PROCEDURE — 2500000003 HC RX 250 WO HCPCS: Performed by: INTERNAL MEDICINE

## 2024-12-23 PROCEDURE — 2500000003 HC RX 250 WO HCPCS: Performed by: NURSE PRACTITIONER

## 2024-12-23 PROCEDURE — 4A133B1 MONITORING OF ARTERIAL PRESSURE, PERIPHERAL, PERCUTANEOUS APPROACH: ICD-10-PCS | Performed by: NURSE PRACTITIONER

## 2024-12-23 PROCEDURE — 82330 ASSAY OF CALCIUM: CPT

## 2024-12-23 PROCEDURE — 85027 COMPLETE CBC AUTOMATED: CPT

## 2024-12-23 PROCEDURE — 6360000002 HC RX W HCPCS: Performed by: INTERNAL MEDICINE

## 2024-12-23 PROCEDURE — 3E033XZ INTRODUCTION OF VASOPRESSOR INTO PERIPHERAL VEIN, PERCUTANEOUS APPROACH: ICD-10-PCS | Performed by: NURSE PRACTITIONER

## 2024-12-23 PROCEDURE — 81001 URINALYSIS AUTO W/SCOPE: CPT

## 2024-12-23 PROCEDURE — 36415 COLL VENOUS BLD VENIPUNCTURE: CPT

## 2024-12-23 PROCEDURE — 6370000000 HC RX 637 (ALT 250 FOR IP): Performed by: INTERNAL MEDICINE

## 2024-12-23 PROCEDURE — 80307 DRUG TEST PRSMV CHEM ANLYZR: CPT

## 2024-12-23 PROCEDURE — 94761 N-INVAS EAR/PLS OXIMETRY MLT: CPT

## 2024-12-23 PROCEDURE — 83735 ASSAY OF MAGNESIUM: CPT

## 2024-12-23 PROCEDURE — 36620 INSERTION CATHETER ARTERY: CPT

## 2024-12-23 PROCEDURE — 82803 BLOOD GASES ANY COMBINATION: CPT

## 2024-12-23 PROCEDURE — 2700000000 HC OXYGEN THERAPY PER DAY

## 2024-12-23 PROCEDURE — 87086 URINE CULTURE/COLONY COUNT: CPT

## 2024-12-23 PROCEDURE — 80048 BASIC METABOLIC PNL TOTAL CA: CPT

## 2024-12-23 PROCEDURE — 80053 COMPREHEN METABOLIC PANEL: CPT

## 2024-12-23 PROCEDURE — 83036 HEMOGLOBIN GLYCOSYLATED A1C: CPT

## 2024-12-23 PROCEDURE — 2580000003 HC RX 258: Performed by: NURSE PRACTITIONER

## 2024-12-23 PROCEDURE — P9045 ALBUMIN (HUMAN), 5%, 250 ML: HCPCS | Performed by: NURSE PRACTITIONER

## 2024-12-23 PROCEDURE — 80202 ASSAY OF VANCOMYCIN: CPT

## 2024-12-23 PROCEDURE — 4A133J1 MONITORING OF ARTERIAL PULSE, PERIPHERAL, PERCUTANEOUS APPROACH: ICD-10-PCS | Performed by: NURSE PRACTITIONER

## 2024-12-23 PROCEDURE — 05HY33Z INSERTION OF INFUSION DEVICE INTO UPPER VEIN, PERCUTANEOUS APPROACH: ICD-10-PCS | Performed by: NURSE PRACTITIONER

## 2024-12-23 PROCEDURE — 94640 AIRWAY INHALATION TREATMENT: CPT

## 2024-12-23 PROCEDURE — 51798 US URINE CAPACITY MEASURE: CPT

## 2024-12-23 PROCEDURE — 5A09457 ASSISTANCE WITH RESPIRATORY VENTILATION, 24-96 CONSECUTIVE HOURS, CONTINUOUS POSITIVE AIRWAY PRESSURE: ICD-10-PCS | Performed by: NURSE PRACTITIONER

## 2024-12-23 PROCEDURE — 2500000003 HC RX 250 WO HCPCS: Performed by: STUDENT IN AN ORGANIZED HEALTH CARE EDUCATION/TRAINING PROGRAM

## 2024-12-23 PROCEDURE — 83605 ASSAY OF LACTIC ACID: CPT

## 2024-12-23 PROCEDURE — 71045 X-RAY EXAM CHEST 1 VIEW: CPT

## 2024-12-23 PROCEDURE — 6370000000 HC RX 637 (ALT 250 FOR IP): Performed by: NURSE PRACTITIONER

## 2024-12-23 PROCEDURE — 2580000003 HC RX 258: Performed by: INTERNAL MEDICINE

## 2024-12-23 PROCEDURE — 1100000000 HC RM PRIVATE

## 2024-12-23 PROCEDURE — P9045 ALBUMIN (HUMAN), 5%, 250 ML: HCPCS | Performed by: INTERNAL MEDICINE

## 2024-12-23 RX ORDER — DEXTROSE MONOHYDRATE 100 MG/ML
INJECTION, SOLUTION INTRAVENOUS CONTINUOUS PRN
Status: DISCONTINUED | OUTPATIENT
Start: 2024-12-23 | End: 2024-12-25 | Stop reason: HOSPADM

## 2024-12-23 RX ORDER — GLUCAGON 1 MG/ML
1 KIT INJECTION PRN
Status: DISCONTINUED | OUTPATIENT
Start: 2024-12-23 | End: 2024-12-25 | Stop reason: HOSPADM

## 2024-12-23 RX ORDER — ALBUMIN HUMAN 50 G/1000ML
25 SOLUTION INTRAVENOUS ONCE
Status: COMPLETED | OUTPATIENT
Start: 2024-12-23 | End: 2024-12-23

## 2024-12-23 RX ORDER — HEPARIN SODIUM 5000 [USP'U]/ML
5000 INJECTION, SOLUTION INTRAVENOUS; SUBCUTANEOUS 2 TIMES DAILY
Status: DISCONTINUED | OUTPATIENT
Start: 2024-12-23 | End: 2024-12-25 | Stop reason: HOSPADM

## 2024-12-23 RX ORDER — DEXTROSE MONOHYDRATE 100 MG/ML
INJECTION, SOLUTION INTRAVENOUS CONTINUOUS PRN
Status: DISCONTINUED | OUTPATIENT
Start: 2024-12-23 | End: 2024-12-23 | Stop reason: SDUPTHER

## 2024-12-23 RX ORDER — GLUCAGON 1 MG/ML
1 KIT INJECTION PRN
Status: DISCONTINUED | OUTPATIENT
Start: 2024-12-23 | End: 2024-12-23 | Stop reason: SDUPTHER

## 2024-12-23 RX ORDER — INSULIN LISPRO 100 [IU]/ML
0-4 INJECTION, SOLUTION INTRAVENOUS; SUBCUTANEOUS EVERY 6 HOURS SCHEDULED
Status: DISCONTINUED | OUTPATIENT
Start: 2024-12-23 | End: 2024-12-23

## 2024-12-23 RX ORDER — CALCIUM GLUCONATE 20 MG/ML
1000 INJECTION, SOLUTION INTRAVENOUS ONCE
Status: COMPLETED | OUTPATIENT
Start: 2024-12-23 | End: 2024-12-23

## 2024-12-23 RX ADMIN — DEXTROSE MONOHYDRATE 250 ML: 100 INJECTION, SOLUTION INTRAVENOUS at 02:19

## 2024-12-23 RX ADMIN — IPRATROPIUM BROMIDE AND ALBUTEROL SULFATE 1 DOSE: 2.5; .5 SOLUTION RESPIRATORY (INHALATION) at 09:23

## 2024-12-23 RX ADMIN — SODIUM CHLORIDE, PRESERVATIVE FREE 10 ML: 5 INJECTION INTRAVENOUS at 21:32

## 2024-12-23 RX ADMIN — SODIUM BICARBONATE: 84 INJECTION, SOLUTION INTRAVENOUS at 05:36

## 2024-12-23 RX ADMIN — DEXMEDETOMIDINE HYDROCHLORIDE 1 MCG/KG/HR: 4 INJECTION, SOLUTION INTRAVENOUS at 03:49

## 2024-12-23 RX ADMIN — SODIUM CHLORIDE, PRESERVATIVE FREE 10 ML: 5 INJECTION INTRAVENOUS at 09:08

## 2024-12-23 RX ADMIN — Medication 35 MCG/MIN: at 01:16

## 2024-12-23 RX ADMIN — BUDESONIDE 500 MCG: 0.5 SUSPENSION RESPIRATORY (INHALATION) at 09:23

## 2024-12-23 RX ADMIN — ALBUMIN (HUMAN) 25 G: 12.5 INJECTION, SOLUTION INTRAVENOUS at 01:12

## 2024-12-23 RX ADMIN — IPRATROPIUM BROMIDE AND ALBUTEROL SULFATE 1 DOSE: 2.5; .5 SOLUTION RESPIRATORY (INHALATION) at 20:32

## 2024-12-23 RX ADMIN — WATER 2000 MG: 1 INJECTION INTRAMUSCULAR; INTRAVENOUS; SUBCUTANEOUS at 02:30

## 2024-12-23 RX ADMIN — HEPARIN SODIUM 5000 UNITS: 5000 INJECTION INTRAVENOUS; SUBCUTANEOUS at 09:08

## 2024-12-23 RX ADMIN — VANCOMYCIN HYDROCHLORIDE 1250 MG: 1.25 INJECTION, POWDER, LYOPHILIZED, FOR SOLUTION INTRAVENOUS at 18:39

## 2024-12-23 RX ADMIN — SODIUM BICARBONATE 50 MEQ: 84 INJECTION, SOLUTION INTRAVENOUS at 02:15

## 2024-12-23 RX ADMIN — INSULIN HUMAN 10 UNITS: 100 INJECTION, SOLUTION PARENTERAL at 02:16

## 2024-12-23 RX ADMIN — ALBUMIN (HUMAN) 25 G: 12.5 INJECTION, SOLUTION INTRAVENOUS at 11:34

## 2024-12-23 RX ADMIN — INSULIN LISPRO 2 UNITS: 100 INJECTION, SOLUTION INTRAVENOUS; SUBCUTANEOUS at 06:29

## 2024-12-23 RX ADMIN — HEPARIN SODIUM 5000 UNITS: 5000 INJECTION INTRAVENOUS; SUBCUTANEOUS at 21:26

## 2024-12-23 RX ADMIN — WATER 2000 MG: 1 INJECTION INTRAMUSCULAR; INTRAVENOUS; SUBCUTANEOUS at 14:47

## 2024-12-23 RX ADMIN — DEXMEDETOMIDINE HYDROCHLORIDE 0.7 MCG/KG/HR: 4 INJECTION, SOLUTION INTRAVENOUS at 09:07

## 2024-12-23 RX ADMIN — CALCIUM GLUCONATE 1000 MG: 20 INJECTION, SOLUTION INTRAVENOUS at 02:31

## 2024-12-23 RX ADMIN — ACETAMINOPHEN 650 MG: 325 TABLET ORAL at 05:44

## 2024-12-23 RX ADMIN — DEXMEDETOMIDINE HYDROCHLORIDE 1.3 MCG/KG/HR: 4 INJECTION, SOLUTION INTRAVENOUS at 01:00

## 2024-12-23 RX ADMIN — BUDESONIDE 500 MCG: 0.5 SUSPENSION RESPIRATORY (INHALATION) at 20:32

## 2024-12-23 RX ADMIN — INSULIN LISPRO 2 UNITS: 100 INJECTION, SOLUTION INTRAVENOUS; SUBCUTANEOUS at 01:08

## 2024-12-23 ASSESSMENT — ENCOUNTER SYMPTOMS
BACK PAIN: 0
CONSTIPATION: 0
ABDOMINAL PAIN: 0
COUGH: 0
SHORTNESS OF BREATH: 0
COLOR CHANGE: 0
DIARRHEA: 0

## 2024-12-23 ASSESSMENT — PAIN SCALES - GENERAL: PAINLEVEL_OUTOF10: 0

## 2024-12-23 NOTE — CONSULTS
Hospitalist Admission Note    NAME: Eleno Escobar   :  1980   MRN:  [id number]     Date/Time:  2024 4:48 PM    Patient PCP: Jose Dumont III, HIRAM - NP    ______________________________________________________________________  Given the patient's current clinical presentation in regards to the patient's multiple medical problems, complex decision making was performed, which includes reviewing the patient's available past medical records, laboratory results, and diagnostic studies.       My assessment of this patient's clinical condition and my plan of care is as follows.    Assessment / Plan:  Septic shock  Acute respiratory failure with hypoxia  Leukocytosis  Pulmonary edema  -Mild pulmonary edema and bilateral lower lobe atelectasis on chest CT.  No PE.  UA negative for infection.  Blood cultures pending.  COVID/flu negative.  - WBC improving, 23> 21.5  - Initially required BiPAP, subsequently weaned and now tolerating 2 L via nasal cannula without difficulty.  Continue to titrate SpO2 to 90 to 95%.  - Patient empirically started on cefepime and vancomycin.  Will continue    EDMUND  - Initially creatinine 2.82, BUN 29.  Has continued to improve and now creatinine 1.84, BUN 31.  - Improved with IV fluids.  Continue to monitor    Hypothermia, resolved  - Patient initially required Siddharth hugger in ICU.  Weaned off of this and now supporting temperature without difficulty.  Per chart review, patient possibly spent at least an hour and a half outside likely contributing to body temperature initially being 91 degrees.    Lactic metabolic acidosis, resolved  Encephalopathy  - Lactic acid 3.3, now improved to 1.7.  - Bicarb was 18 with anion gap of 18.  Patient received sodium bicarb via IV; now anion gap closed at 5 with CO2 29.  - ABG significantly improved-pH 7.28, PaO2 96, pCO2 55  -UDS was positive for cocaine and THC    Elevated liver enzymes  - Pericholecystic edema, no gallstones identified

## 2024-12-23 NOTE — PROGRESS NOTES
1815- patient arrives at ICU, patient still lethargic, hypothermic and hypotensive, MD at bedside, see MAR for interventions for BP.

## 2024-12-23 NOTE — PROGRESS NOTES
Received Order for Telemetry     Eleno SPAULDING Shawn   1980   [id number]   Septic shock (HCC) [A41.9, R65.21]   Fox Whitaker*     Tele Box # [phone] placed on patient at  1815 pm  ER Room # 269  Admitting to Room 221  Verified with Primary Nurse MARIOLA at  1815 pm

## 2024-12-23 NOTE — PROGRESS NOTES
[facility]    ICU TEAM Progress Note    Name: Eleno Escobar   : 1980   MRN: [id number]   Date: 2024      Assessment/Plan:     Shock, presumed septic  Heart rate is not elevated, in fact borderline bradycardic, lactate is elevated to 15, profound metabolic acidemia compounded by secondary respiratory acidemia that is likely reactionary  Empiric antibiotics with vancomycin, cefepime  LP attempted in ED, currently no indication for reattempt  Concern remains for substance use, although patient denies beyond THC.  He states that he and his wife did take a pill provided by a friend for a headache, but is not sure what that pill was.  UDS positive for THC and cocaine, patient denies cocaine use  Continue Levophed, overall improved, down to 10, continue to wean to off as able  Hypothermia, resolved  Also as above  Potentially environmental, and potentially the cause of all patient's symptoms  Patient does provide the history that he and his wife spend at least an hour and a half outside in the cold, likely contributing to patient's body temperature of initially being 91  Lactic metabolic acidosis  Consistent with hypoperfusion of unknown etiology, presumed sepsis  IVF bolus completed  APAP and salicylate levels negative  Lactate normalized as of this morning  Encephalopathy, acute, metabolic  Unclear etiology, infectious, ingestion, toxin, carbon monoxide poisoning  Unclear if above is contributing, or if it is resulting from  Concern for ingestion/substance use/intoxication  UDS with cocaine and THC  Patient remains somewhat lethargic and slow to respond, but overall greatly improved from time of admission  Remains on low-dose Precedex drip  EDMUND  Creatinine presumed to previously be normal, however, no priors for comparison available  BP support, IVF's  Creatinine improved, adequate urine output  Elevated LFTs  Likely due to above, monitor  Acute hypoxic/hypercarbic respiratory

## 2024-12-23 NOTE — SIGNIFICANT EVENT
Patient seen and examined.   Patient able to tell me full name, , current year and current location but remains confused not understanding why he is in the hospital, continues to ask the same questions repeatedly and not able to carry a full conversation. Patient is acidotic with worsening PH   ABG 7.08/73/41/21/-9. Currently on levo 30 with SBP in the low 90's. On bicarb gtt.     Discussed need for BIPAP, CVL, and Ayah  Despite repeatedly explaining to him why these interventions are needed he still remains confused and is not able to give consent. Patient stated he would like me to speak to his tello sorto for further guidance and requested I obtain consent from her. Patient also stated it was ok to update his aunt Monica Munson [phone].     Spoke with tello Sin on the phone [phone]. Updated her on patient current clinical status and treatment plan. Discussed need for CVL, Ayah, BIPAP. All questions answered and she agreed to proceed with procedures.     Plans to start precedex to assist with tolerating BIPAP.   CTA chest with no PE, mild pulm edema. CT abdomen with no significant findings.    Will continue to trend ABG, Lactic, BMP.     0000  RIJ CVL and R brachial ayah placed. Required precedex for sedation. Tolerated procedure well. CXR with no PTX.   Still has not voided. Bladder scan with 340 cc. Will continue to bladder scan and will straight cath if >400 cc of urine in bladder.     0100  ABG 7.19/52/109/19 improving. Will continue BIPAP and HCO3 gtt  Levo increased to 45, will give albumin 5% 500 cc. Wean precedex gtt.       0400  Levo weaned to 18  Lactic 3.3  ABG      Patient is high risk for life threatening deterioration.   Critical care time 45 min

## 2024-12-23 NOTE — ED PROVIDER NOTES
Magnesium    Collection Time: 12/22/24  2:38 PM   Result Value Ref Range    Magnesium 3.3 (H) 1.6 - 2.4 mg/dL   Salicylate    Collection Time: 12/22/24  2:38 PM   Result Value Ref Range    Salicylate Lvl 5.1 2.8 - 20.0 mg/dL    DOSE DATE Not provided      DOSE AMOUNT Not provided Units   Acetaminophen Level    Collection Time: 12/22/24  2:38 PM   Result Value Ref Range    Acetaminophen Level <2 (L) 10 - 30 ug/mL    DOSE DATE Not provided      DOSE AMOUNT Not provided Units   Brain Natriuretic Peptide    Collection Time: 12/22/24  2:38 PM   Result Value Ref Range    NT Pro-BNP 84 <125 pg/mL   POC Blood Gas and Chemistry    Collection Time: 12/22/24  2:40 PM   Result Value Ref Range    POC pH 6.90 (LL) 7.35 - 7.45      POC pCO2 84.5 (H) 35.0 - 45.0 mmHg    POC PO2 <27 (LL) 75 - 100 mmHg    POC Sodium 137 136 - 145 mmol/L    POC Potassium 5.0 3.5 - 5.5 mmol/L    POC Ionized Calcium 1.22 1.12 - 1.32 mmol/L    POC Glucose 409 (H) 65 - 100 mg/dL    POC Chloride 104 98 - 107 MMOL/L    POC Creatinine 2.18 (H) 0.6 - 1.3 MG/DL    eGFR, POC 38 (L) >60 ml/min/1.73m2    Base Deficit (POC) 18.5 mmol/L    POC HCO3 16.6 (L) 19.0 - 28.0 mmol/L    POC TCO2 18 MMOL/L    Source Venous      Performed by: Brando Mcclain     POC Lactic Acid 15.01 (HH) 0.40 - 2.00 mmol/L    Critical Value Read Back CLYDE SANTIAGO    Culture, Blood 2    Collection Time: 12/22/24  3:07 PM    Specimen: Blood   Result Value Ref Range    Special Requests No Special Requests      Culture No growth after 4 hours     Blood Gas, Arterial    Collection Time: 12/22/24  3:07 PM   Result Value Ref Range    pH, Arterial 6.93 (LL) 7.35 - 7.45      pCO2, Arterial 57 (H) 35 - 45 mmHg    pO2, Arterial 62 (L) 80 - 100 mmHg    O2 Sat, Arterial 87 (L) 95 - 99 %    HCO3, Arterial 12 (L) 22 - 26 mmol/L    Base deficit, arterial blood 21.4 mmol/L    O2 Method Room air      FIO2 Arterial 21.0 %    Source Arterial      Site Left Radial      Fer Test YES      Carboxyhgb, Arterial

## 2024-12-23 NOTE — PROGRESS NOTES
Vancomycin Dosing Consult  Eleno Escobar is a 44 y.o. male with sepsis. Pharmacy was consulted by Dr. Harris to dose and monitor vancomycin. Today is day 2.    Antibiotic regimen: Vancomycin + Cefepime    Temp (24hrs), Av.1 °F (36.7 °C), Min:91.5 °F (33.1 °C), Max:101.2 °F (38.4 °C)    Recent Labs     24  1438 24  1440 24  0049 24  0530 24  0800   WBC 23.0*  --   --  21.5*  --    CREATININE 2.82*   < > 2.66* 2.05* 1.84*   BUN 29*   < > 34* 32* 31*    < > = values in this interval not displayed.     Est CrCl: 59 mL/min  Concomitant nephrotoxic drugs: Vasopressors    Cultures:    Blood x 2: NGTD <24 hours    MRSA Swab: Not detected     Target range: Re-dose for random level <15 mcg/mL    Recent level history:  Date/Time Dose & Interval Measured Level (mcg/mL) Associated AUC/GUILLE    @0800 LD 2500 mg x 1 20.8         Assessment/Plan:   Renal function is improving, will continue to pulse dose until Scr stabilizes.  Level resulted at 20.8, with improving renal function, will schedule a dose for tonight when level is predicted to be <15 mcg/mL  Ordered Vancomycin IV 1250 mg x 1  Next level scheduled for  @1700  CMP ordered through   Antimicrobial stop date 7 days per consult

## 2024-12-23 NOTE — PROCEDURES
PROCEDURE NOTE  Date: 12/23/2024   Name: Eleno Escobar  YOB: 1980    CENTRAL LINE    Date/Time: 12/23/2024 2:06 AM    Performed by: Neetu Jaramillo APRN - CNP  Authorized by: Neetu Jaramillo APRN - CNP  Consent: Written consent obtained.  Risks and benefits: risks, benefits and alternatives were discussed  Consent given by: spouse  Site marked: the operative site was marked  Patient identity confirmed: arm band, hospital-assigned identification number and verbally with patient  Time out: Immediately prior to procedure a \"time out\" was called to verify the correct patient, procedure, equipment, support staff and site/side marked as required.  Indications: vascular access    Anesthesia:  Local Anesthetic: lidocaine 1% without epinephrine    Sedation:  Patient sedated: yes  Sedatives: see MAR for details  Vitals: Vital signs were monitored during sedation.    Preparation: skin prepped with 2% chlorhexidine  Skin prep agent dried: skin prep agent completely dried prior to procedure  Sterile barriers: all five maximum sterile barriers used - cap, mask, sterile gown, sterile gloves, and large sterile sheet  Hand hygiene: hand hygiene performed prior to central venous catheter insertion  Location details: right internal jugular  Patient position: flat  Catheter type: triple lumen  Catheter size: 7 Fr  Pre-procedure: landmarks identified  Ultrasound guidance: yes  Sterile ultrasound techniques: sterile gel and sterile probe covers were used  Number of attempts: 1  Successful placement: yes  Post-procedure: line sutured and dressing applied  Assessment: blood return through all ports, placement verified by x-ray and no pneumothorax on x-ray  Patient tolerance: patient tolerated the procedure well with no immediate complications

## 2024-12-23 NOTE — PROCEDURES
PROCEDURE NOTE  Date: 12/23/2024   Name: Eleno Escobar  YOB: 1980    Insert Arterial Line    Date/Time: 12/23/2024 2:04 AM    Performed by: Neetu Jaramillo APRN - CNP  Authorized by: Neetu Jaramillo APRN - CNP  Consent: Written consent obtained.  Risks and benefits: risks, benefits and alternatives were discussed  Consent given by: spouse  Patient identity confirmed: arm band, hospital-assigned identification number and verbally with patient  Time out: Immediately prior to procedure a \"time out\" was called to verify the correct patient, procedure, equipment, support staff and site/side marked as required.  Preparation: Patient was prepped and draped in the usual sterile fashion.  Indications: hemodynamic monitoring  Location: right brachial    Sedation:  Patient sedated: yes  Analgesia: see MAR for details    Fer's test normal: yes  Needle gauge: 20  Seldinger technique: Seldinger technique used  Number of attempts: 1  Post-procedure: line sutured and dressing applied  Post-procedure CMS: normal  Patient tolerance: patient tolerated the procedure well with no immediate complications

## 2024-12-23 NOTE — PROGRESS NOTES
Patient's father called. Received verbal consent from patient to update father, then transferred phone call into patient room for patient.         1830: Report called and patient transported to Room 221 with RN x2. Receiving RN present. Patient A&Ox4.

## 2024-12-24 LAB
ALBUMIN SERPL-MCNC: 3.5 G/DL (ref 3.5–5)
ALBUMIN/GLOB SERPL: 1.3 (ref 1.1–2.2)
ALP SERPL-CCNC: 52 U/L (ref 45–117)
ALT SERPL-CCNC: 885 U/L (ref 12–78)
ANION GAP SERPL CALC-SCNC: 3 MMOL/L (ref 2–12)
AST SERPL W P-5'-P-CCNC: ABNORMAL U/L (ref 15–37)
BACTERIA SPEC CULT: NORMAL
BASOPHILS # BLD: 0 K/UL (ref 0–0.1)
BASOPHILS NFR BLD: 0 % (ref 0–1)
BILIRUB SERPL-MCNC: 0.8 MG/DL (ref 0.2–1)
BUN SERPL-MCNC: 15 MG/DL (ref 6–20)
BUN/CREAT SERPL: 18 (ref 12–20)
CA-I BLD-MCNC: 9.1 MG/DL (ref 8.5–10.1)
CHLORIDE SERPL-SCNC: 104 MMOL/L (ref 97–108)
CO2 SERPL-SCNC: 33 MMOL/L (ref 21–32)
CREAT SERPL-MCNC: 0.85 MG/DL (ref 0.7–1.3)
DATE LAST DOSE: NORMAL
DIFFERENTIAL METHOD BLD: ABNORMAL
DOSE AMOUNT: NORMAL UNITS
EOSINOPHIL # BLD: 0 K/UL (ref 0–0.4)
EOSINOPHIL NFR BLD: 0 % (ref 0–7)
ERYTHROCYTE [DISTWIDTH] IN BLOOD BY AUTOMATED COUNT: 12.5 % (ref 11.5–14.5)
GLOBULIN SER CALC-MCNC: 2.7 G/DL (ref 2–4)
GLUCOSE BLD STRIP.AUTO-MCNC: 107 MG/DL (ref 65–100)
GLUCOSE BLD STRIP.AUTO-MCNC: 112 MG/DL (ref 65–100)
GLUCOSE BLD STRIP.AUTO-MCNC: 114 MG/DL (ref 65–100)
GLUCOSE BLD STRIP.AUTO-MCNC: 89 MG/DL (ref 65–100)
GLUCOSE BLD STRIP.AUTO-MCNC: 97 MG/DL (ref 65–100)
GLUCOSE SERPL-MCNC: 90 MG/DL (ref 65–100)
HCT VFR BLD AUTO: 39.2 % (ref 36.6–50.3)
HGB BLD-MCNC: 12.8 G/DL (ref 12.1–17)
IMM GRANULOCYTES # BLD AUTO: 0.1 K/UL (ref 0–0.04)
IMM GRANULOCYTES NFR BLD AUTO: 1 % (ref 0–0.5)
LYMPHOCYTES # BLD: 2 K/UL (ref 0.8–3.5)
LYMPHOCYTES NFR BLD: 16 % (ref 12–49)
Lab: NORMAL
MAGNESIUM SERPL-MCNC: NORMAL MG/DL (ref 1.6–2.4)
MCH RBC QN AUTO: 30.3 PG (ref 26–34)
MCHC RBC AUTO-ENTMCNC: 32.7 G/DL (ref 30–36.5)
MCV RBC AUTO: 92.7 FL (ref 80–99)
MONOCYTES # BLD: 0.9 K/UL (ref 0–1)
MONOCYTES NFR BLD: 7 % (ref 5–13)
NEUTS SEG # BLD: 9.3 K/UL (ref 1.8–8)
NEUTS SEG NFR BLD: 76 % (ref 32–75)
NRBC # BLD: 0 K/UL (ref 0–0.01)
NRBC BLD-RTO: 0 PER 100 WBC
PERFORMED BY:: ABNORMAL
PERFORMED BY:: NORMAL
PERFORMED BY:: NORMAL
PHOSPHATE SERPL-MCNC: 2.1 MG/DL (ref 2.6–4.7)
PLATELET # BLD AUTO: 84 K/UL (ref 150–400)
PMV BLD AUTO: 11.5 FL (ref 8.9–12.9)
POTASSIUM SERPL-SCNC: ABNORMAL MMOL/L (ref 3.5–5.1)
PROT SERPL-MCNC: 6.2 G/DL (ref 6.4–8.2)
RBC # BLD AUTO: 4.23 M/UL (ref 4.1–5.7)
SODIUM SERPL-SCNC: 140 MMOL/L (ref 136–145)
VANCOMYCIN SERPL-MCNC: 3.8 UG/ML
WBC # BLD AUTO: 12.3 K/UL (ref 4.1–11.1)

## 2024-12-24 PROCEDURE — 83735 ASSAY OF MAGNESIUM: CPT

## 2024-12-24 PROCEDURE — 84100 ASSAY OF PHOSPHORUS: CPT

## 2024-12-24 PROCEDURE — 6360000002 HC RX W HCPCS: Performed by: NURSE PRACTITIONER

## 2024-12-24 PROCEDURE — 6360000002 HC RX W HCPCS: Performed by: INTERNAL MEDICINE

## 2024-12-24 PROCEDURE — 94640 AIRWAY INHALATION TREATMENT: CPT

## 2024-12-24 PROCEDURE — 6370000000 HC RX 637 (ALT 250 FOR IP): Performed by: INTERNAL MEDICINE

## 2024-12-24 PROCEDURE — 6370000000 HC RX 637 (ALT 250 FOR IP)

## 2024-12-24 PROCEDURE — 94761 N-INVAS EAR/PLS OXIMETRY MLT: CPT

## 2024-12-24 PROCEDURE — 36415 COLL VENOUS BLD VENIPUNCTURE: CPT

## 2024-12-24 PROCEDURE — 2700000000 HC OXYGEN THERAPY PER DAY

## 2024-12-24 PROCEDURE — 80202 ASSAY OF VANCOMYCIN: CPT

## 2024-12-24 PROCEDURE — 80053 COMPREHEN METABOLIC PANEL: CPT

## 2024-12-24 PROCEDURE — 2580000003 HC RX 258

## 2024-12-24 PROCEDURE — 85025 COMPLETE CBC W/AUTO DIFF WBC: CPT

## 2024-12-24 PROCEDURE — 2500000003 HC RX 250 WO HCPCS: Performed by: INTERNAL MEDICINE

## 2024-12-24 PROCEDURE — 6360000002 HC RX W HCPCS

## 2024-12-24 PROCEDURE — 1100000000 HC RM PRIVATE

## 2024-12-24 PROCEDURE — 82962 GLUCOSE BLOOD TEST: CPT

## 2024-12-24 PROCEDURE — 94660 CPAP INITIATION&MGMT: CPT

## 2024-12-24 RX ORDER — HYDRALAZINE HYDROCHLORIDE 20 MG/ML
10 INJECTION INTRAMUSCULAR; INTRAVENOUS EVERY 6 HOURS PRN
Status: DISCONTINUED | OUTPATIENT
Start: 2024-12-24 | End: 2024-12-25 | Stop reason: HOSPADM

## 2024-12-24 RX ORDER — LOSARTAN POTASSIUM 50 MG/1
50 TABLET ORAL DAILY
Status: DISCONTINUED | OUTPATIENT
Start: 2024-12-24 | End: 2024-12-25 | Stop reason: HOSPADM

## 2024-12-24 RX ADMIN — WATER 2000 MG: 1 INJECTION INTRAMUSCULAR; INTRAVENOUS; SUBCUTANEOUS at 04:00

## 2024-12-24 RX ADMIN — LOSARTAN POTASSIUM 50 MG: 50 TABLET, FILM COATED ORAL at 16:43

## 2024-12-24 RX ADMIN — SODIUM CHLORIDE, PRESERVATIVE FREE 10 ML: 5 INJECTION INTRAVENOUS at 22:28

## 2024-12-24 RX ADMIN — VANCOMYCIN HYDROCHLORIDE 1500 MG: 750 INJECTION, POWDER, LYOPHILIZED, FOR SOLUTION INTRAVENOUS at 19:26

## 2024-12-24 RX ADMIN — HYDRALAZINE HYDROCHLORIDE 10 MG: 20 INJECTION INTRAMUSCULAR; INTRAVENOUS at 14:34

## 2024-12-24 RX ADMIN — HEPARIN SODIUM 5000 UNITS: 5000 INJECTION INTRAVENOUS; SUBCUTANEOUS at 09:43

## 2024-12-24 RX ADMIN — SODIUM CHLORIDE, PRESERVATIVE FREE 10 ML: 5 INJECTION INTRAVENOUS at 09:43

## 2024-12-24 RX ADMIN — WATER 2000 MG: 1 INJECTION INTRAMUSCULAR; INTRAVENOUS; SUBCUTANEOUS at 14:26

## 2024-12-24 RX ADMIN — BUDESONIDE 500 MCG: 0.5 SUSPENSION RESPIRATORY (INHALATION) at 21:36

## 2024-12-24 RX ADMIN — IPRATROPIUM BROMIDE AND ALBUTEROL SULFATE 1 DOSE: 2.5; .5 SOLUTION RESPIRATORY (INHALATION) at 21:36

## 2024-12-24 RX ADMIN — HEPARIN SODIUM 5000 UNITS: 5000 INJECTION INTRAVENOUS; SUBCUTANEOUS at 22:06

## 2024-12-24 ASSESSMENT — ENCOUNTER SYMPTOMS
SHORTNESS OF BREATH: 0
COUGH: 1
COLOR CHANGE: 0
CONSTIPATION: 0
DIARRHEA: 0
BACK PAIN: 0
ABDOMINAL PAIN: 0

## 2024-12-24 NOTE — PROGRESS NOTES
Patient refused CPAP at this time, and is on Oxygen. Respiratory informed. He states he just needs a rest from it. He says it is causing him to have nose bleed. He is on O2 1L via NC.     Both LEANNE on right arm removed as patient complaint of arm pain and request for them to come out.  LEANNE removed with nil issues. 1 LEANNE remains in situ on Left arm.

## 2024-12-24 NOTE — PROGRESS NOTES
Hospitalist Progress Note    NAME:   Eleno Escobar   : 1980   MRN: [id number]     Date/Time: 2024 4:29 PM  Patient PCP: Jose Dumont III, APRN - ESTELLE    Estimated discharge date: 24  Barriers: Improvement in liver enzymes, continued improvement in WBC    Hospital Course:  Eleno Escobar is a 44 y.o.  male with PMHx significant for type 2 diabetes, hypertension, hyperlipidemia admitted to ICU admission for presumed septic shock.  Patient presented to the ED 2024 with hypothermia at 91 °F, hypotensive requiring Levophed support.  Patient also with severe lactic metabolic acidosis, encephalopathy, EDMUND, respiratory failure requiring BiPAP and elevated liver enzymes. UDS positive for cocaine and marijuana. CTA chest shows mild pulmonary edema and bilateral lower lobe atelectasis with no evidence of PE. Patient was weaned from BiPAP to 2 L via nasal cannula and is tolerating without difficulty. Patient weaned off of Levophed and Siddharth hugger. Patient was started on vancomycin and cefepime empirically for septic shock. Overall, patient's mental status, respiratory status and metabolic acidosis have significantly improved.  Patient transferred to medical floor 2024.    Assessment / Plan:  Septic shock  Acute respiratory failure with hypoxia  Leukocytosis  Pulmonary edema  -Mild pulmonary edema and bilateral lower lobe atelectasis on chest CT.  No PE.  UA negative for infection.  Blood cultures pending.  COVID/flu negative.  - WBC improving, 23> 21.5> 12.3  - Initially required BiPAP, subsequently weaned and now tolerating room air without difficulty.  Continue to titrate SpO2 to 90 to 95%.  - Patient empirically started on cefepime and vancomycin.  Will continue     EDMUND  - Initially creatinine 2.82, BUN 29.  Has continued to improve and now creatinine 0.85, BUN 15  - Improved with IV fluids.  Continue to monitor     Hypothermia, resolved  - Patient initially required Siddharth hugger in ICU.

## 2024-12-24 NOTE — PLAN OF CARE
Problem: Chronic Conditions and Co-morbidities  Goal: Patient's chronic conditions and co-morbidity symptoms are monitored and maintained or improved  12/24/2024 1150 by Smiley Junior RN  Outcome: Progressing  Flowsheets (Taken 12/24/2024 0805)  Care Plan - Patient's Chronic Conditions and Co-Morbidity Symptoms are Monitored and Maintained or Improved: Monitor and assess patient's chronic conditions and comorbid symptoms for stability, deterioration, or improvement  12/24/2024 0259 by Lamont London RN  Outcome: Progressing     Problem: Discharge Planning  Goal: Discharge to home or other facility with appropriate resources  Recent Flowsheet Documentation  Taken 12/24/2024 0805 by Smiley Junior RN  Discharge to home or other facility with appropriate resources: Identify barriers to discharge with patient and caregiver  12/24/2024 0259 by Lamont London RN  Outcome: Progressing     Problem: Safety - Adult  Goal: Free from fall injury  12/24/2024 1150 by Smiley Junior RN  Outcome: Progressing  12/24/2024 0259 by Lamont London RN  Outcome: Progressing     Problem: Neurosensory - Adult  Goal: Achieves stable or improved neurological status  12/24/2024 1150 by Smiley Junior RN  Outcome: Progressing  Flowsheets (Taken 12/24/2024 0805)  Achieves stable or improved neurological status: Assess for and report changes in neurological status  12/24/2024 0259 by Lamont London RN  Outcome: Progressing  Goal: Achieves maximal functionality and self care  12/24/2024 1150 by Smiley Junior RN  Outcome: Progressing  Flowsheets (Taken 12/24/2024 0805)  Achieves maximal functionality and self care: Monitor swallowing and airway patency with patient fatigue and changes in neurological status  12/24/2024 0259 by Lamont London RN  Outcome: Progressing     Problem: Respiratory - Adult  Goal: Achieves optimal ventilation and oxygenation  12/24/2024 1150 by

## 2024-12-24 NOTE — CARE COORDINATION
Current disposition remains home.  CM team will continue to follow as needs arise.  Per flowsheet, patient is currently on 1LPM oxygen via nasal cannula.  Previous CM notes no DME use at home.   
Planning   Services At/After Discharge None    Resource Information Provided? No   Mode of Transport at Discharge Other (see comment)  (Family)   Confirm Follow Up Transport Family     CM met with pt & D/C Plan is home with (GF) & father & family to transport. Self care/uses no DME. Send Rxs to [facility] upon discharge.     Advance Care Planning     General Advance Care Planning (ACP) Conversation    Date of Conversation: 12/23/2024  Conducted with: Patient with Decision Making Capacity and Legal next of kin  Other persons present: None    Healthcare Decision Maker: No healthcare decision makers have been documented.       Content/Action Overview:    Reviewed DNR/DNI and patient         Length of Voluntary ACP Conversation in minutes:      Alejandra Lowe RN

## 2024-12-24 NOTE — PROGRESS NOTES
Vancomycin Dosing Consult  Eleno Escobar is a 44 y.o. male with sepsis. Pharmacy was consulted by Dr. Harris to dose and monitor vancomycin. Today is day 3.    Antibiotic regimen: Vancomycin + Cefepime    Temp (24hrs), Av.6 °F (36.4 °C), Min:97.3 °F (36.3 °C), Max:98.2 °F (36.8 °C)    Recent Labs     24  1438 24  1440 24  0530 24  0800 24  0638   WBC 23.0*  --  21.5*  --  12.3*   CREATININE 2.82*   < > 2.05* 1.84* 0.85   BUN 29*   < > 32* 31* 15    < > = values in this interval not displayed.     Est CrCl: 129mL/min  Concomitant nephrotoxic drugs: Vasopressors    Cultures:    Blood x 2: NGTD    Urine: No growth     MRSA Swab: Not detected     Target range: AUC/GUILLE 400-600    Recent level history:  Date/Time Dose & Interval Measured Level (mcg/mL) Associated AUC/GUILLE    @0800 LD 2500 mg x 1 20.8      1649 1250mg () 3.8         Assessment/Plan:   Scr has improved.  Begin AUC dosing.  Level subtherapeutic today due to improved renal function.  Begin 1500 mg Vancomycin q12h with predicted AUC of 479 at ss.  Next level scheduled for  @1700  CMP ordered through   Antimicrobial stop date 7 days per consult

## 2024-12-24 NOTE — PLAN OF CARE
Problem: Chronic Conditions and Co-morbidities  Goal: Patient's chronic conditions and co-morbidity symptoms are monitored and maintained or improved  Outcome: Progressing     Problem: Discharge Planning  Goal: Discharge to home or other facility with appropriate resources  Outcome: Progressing     Problem: Safety - Adult  Goal: Free from fall injury  Outcome: Progressing     Problem: Neurosensory - Adult  Goal: Achieves stable or improved neurological status  Outcome: Progressing  Goal: Achieves maximal functionality and self care  Outcome: Progressing     Problem: Respiratory - Adult  Goal: Achieves optimal ventilation and oxygenation  Outcome: Progressing     Problem: Skin/Tissue Integrity - Adult  Goal: Skin integrity remains intact  Outcome: Progressing  Flowsheets (Taken 12/23/2024 2045)  Skin Integrity Remains Intact: Monitor for areas of redness and/or skin breakdown     Problem: Musculoskeletal - Adult  Goal: Return mobility to safest level of function  Outcome: Progressing  Goal: Return ADL status to a safe level of function  Outcome: Progressing     Problem: Metabolic/Fluid and Electrolytes - Adult  Goal: Electrolytes maintained within normal limits  Outcome: Progressing  Goal: Hemodynamic stability and optimal renal function maintained  Outcome: Progressing     Problem: Skin/Tissue Integrity  Goal: Absence of new skin breakdown  Description: 1.  Monitor for areas of redness and/or skin breakdown  2.  Assess vascular access sites hourly  3.  Every 4-6 hours minimum:  Change oxygen saturation probe site  4.  Every 4-6 hours:  If on nasal continuous positive airway pressure, respiratory therapy assess nares and determine need for appliance change or resting period.  Outcome: Progressing     Problem: Coping  Goal: Pt/Family able to verbalize concerns and demonstrate effective coping strategies  Description: INTERVENTIONS:  1. Assist patient/family to identify coping skills, available support systems and

## 2024-12-25 VITALS
WEIGHT: 221.34 LBS | TEMPERATURE: 98.2 F | DIASTOLIC BLOOD PRESSURE: 100 MMHG | BODY MASS INDEX: 33.55 KG/M2 | HEIGHT: 68 IN | SYSTOLIC BLOOD PRESSURE: 151 MMHG | RESPIRATION RATE: 15 BRPM | HEART RATE: 83 BPM | OXYGEN SATURATION: 97 %

## 2024-12-25 LAB
ALBUMIN SERPL-MCNC: 3.6 G/DL (ref 3.5–5)
ALBUMIN/GLOB SERPL: 1.2 (ref 1.1–2.2)
ALP SERPL-CCNC: 58 U/L (ref 45–117)
ALT SERPL-CCNC: 632 U/L (ref 12–78)
ANION GAP SERPL CALC-SCNC: 5 MMOL/L (ref 2–12)
AST SERPL W P-5'-P-CCNC: 215 U/L (ref 15–37)
BASOPHILS # BLD: 0 K/UL (ref 0–0.1)
BASOPHILS NFR BLD: 0 % (ref 0–1)
BILIRUB SERPL-MCNC: 0.9 MG/DL (ref 0.2–1)
BUN SERPL-MCNC: 9 MG/DL (ref 6–20)
BUN/CREAT SERPL: 13 (ref 12–20)
CA-I BLD-MCNC: 9.1 MG/DL (ref 8.5–10.1)
CHLORIDE SERPL-SCNC: 111 MMOL/L (ref 97–108)
CO2 SERPL-SCNC: 28 MMOL/L (ref 21–32)
CREAT SERPL-MCNC: 0.69 MG/DL (ref 0.7–1.3)
DIFFERENTIAL METHOD BLD: ABNORMAL
EOSINOPHIL # BLD: 0.1 K/UL (ref 0–0.4)
EOSINOPHIL NFR BLD: 1 % (ref 0–7)
ERYTHROCYTE [DISTWIDTH] IN BLOOD BY AUTOMATED COUNT: 12.5 % (ref 11.5–14.5)
GLOBULIN SER CALC-MCNC: 3 G/DL (ref 2–4)
GLUCOSE BLD STRIP.AUTO-MCNC: 91 MG/DL (ref 65–100)
GLUCOSE BLD STRIP.AUTO-MCNC: 95 MG/DL (ref 65–100)
GLUCOSE BLD STRIP.AUTO-MCNC: 98 MG/DL (ref 65–100)
GLUCOSE SERPL-MCNC: 97 MG/DL (ref 65–100)
HCT VFR BLD AUTO: 39.6 % (ref 36.6–50.3)
HGB BLD-MCNC: 13.1 G/DL (ref 12.1–17)
IMM GRANULOCYTES # BLD AUTO: 0.1 K/UL (ref 0–0.04)
IMM GRANULOCYTES NFR BLD AUTO: 1 % (ref 0–0.5)
LYMPHOCYTES # BLD: 1.8 K/UL (ref 0.8–3.5)
LYMPHOCYTES NFR BLD: 19 % (ref 12–49)
MAGNESIUM SERPL-MCNC: 2.1 MG/DL (ref 1.6–2.4)
MCH RBC QN AUTO: 30.3 PG (ref 26–34)
MCHC RBC AUTO-ENTMCNC: 33.1 G/DL (ref 30–36.5)
MCV RBC AUTO: 91.5 FL (ref 80–99)
MONOCYTES # BLD: 0.8 K/UL (ref 0–1)
MONOCYTES NFR BLD: 9 % (ref 5–13)
NEUTS SEG # BLD: 6.9 K/UL (ref 1.8–8)
NEUTS SEG NFR BLD: 70 % (ref 32–75)
NRBC # BLD: 0 K/UL (ref 0–0.01)
NRBC BLD-RTO: 0 PER 100 WBC
PERFORMED BY:: NORMAL
PHOSPHATE SERPL-MCNC: 1.6 MG/DL (ref 2.6–4.7)
PLATELET # BLD AUTO: 95 K/UL (ref 150–400)
PMV BLD AUTO: 11.7 FL (ref 8.9–12.9)
POTASSIUM SERPL-SCNC: 3.2 MMOL/L (ref 3.5–5.1)
PROT SERPL-MCNC: 6.6 G/DL (ref 6.4–8.2)
RBC # BLD AUTO: 4.33 M/UL (ref 4.1–5.7)
SODIUM SERPL-SCNC: 144 MMOL/L (ref 136–145)
WBC # BLD AUTO: 9.8 K/UL (ref 4.1–11.1)

## 2024-12-25 PROCEDURE — 2500000003 HC RX 250 WO HCPCS: Performed by: INTERNAL MEDICINE

## 2024-12-25 PROCEDURE — 94761 N-INVAS EAR/PLS OXIMETRY MLT: CPT

## 2024-12-25 PROCEDURE — 36415 COLL VENOUS BLD VENIPUNCTURE: CPT

## 2024-12-25 PROCEDURE — 83735 ASSAY OF MAGNESIUM: CPT

## 2024-12-25 PROCEDURE — 6360000002 HC RX W HCPCS

## 2024-12-25 PROCEDURE — 85025 COMPLETE CBC W/AUTO DIFF WBC: CPT

## 2024-12-25 PROCEDURE — 80053 COMPREHEN METABOLIC PANEL: CPT

## 2024-12-25 PROCEDURE — 6370000000 HC RX 637 (ALT 250 FOR IP)

## 2024-12-25 PROCEDURE — 82962 GLUCOSE BLOOD TEST: CPT

## 2024-12-25 PROCEDURE — 6370000000 HC RX 637 (ALT 250 FOR IP): Performed by: INTERNAL MEDICINE

## 2024-12-25 PROCEDURE — 84100 ASSAY OF PHOSPHORUS: CPT

## 2024-12-25 PROCEDURE — 6360000002 HC RX W HCPCS: Performed by: INTERNAL MEDICINE

## 2024-12-25 PROCEDURE — 2580000003 HC RX 258

## 2024-12-25 PROCEDURE — 94640 AIRWAY INHALATION TREATMENT: CPT

## 2024-12-25 RX ORDER — LEVOFLOXACIN 750 MG/1
750 TABLET, FILM COATED ORAL DAILY
Qty: 5 TABLET | Refills: 0 | Status: SHIPPED | OUTPATIENT
Start: 2024-12-25 | End: 2024-12-30

## 2024-12-25 RX ADMIN — IPRATROPIUM BROMIDE AND ALBUTEROL SULFATE 1 DOSE: 2.5; .5 SOLUTION RESPIRATORY (INHALATION) at 08:10

## 2024-12-25 RX ADMIN — WATER 2000 MG: 1 INJECTION INTRAMUSCULAR; INTRAVENOUS; SUBCUTANEOUS at 03:39

## 2024-12-25 RX ADMIN — SODIUM CHLORIDE, PRESERVATIVE FREE 10 ML: 5 INJECTION INTRAVENOUS at 03:58

## 2024-12-25 RX ADMIN — VANCOMYCIN HYDROCHLORIDE 1500 MG: 750 INJECTION, POWDER, LYOPHILIZED, FOR SOLUTION INTRAVENOUS at 07:18

## 2024-12-25 RX ADMIN — SODIUM CHLORIDE, PRESERVATIVE FREE 10 ML: 5 INJECTION INTRAVENOUS at 07:15

## 2024-12-25 RX ADMIN — HYDRALAZINE HYDROCHLORIDE 10 MG: 20 INJECTION INTRAMUSCULAR; INTRAVENOUS at 03:58

## 2024-12-25 RX ADMIN — LOSARTAN POTASSIUM 50 MG: 50 TABLET, FILM COATED ORAL at 09:34

## 2024-12-25 RX ADMIN — BUDESONIDE 500 MCG: 0.5 SUSPENSION RESPIRATORY (INHALATION) at 08:10

## 2024-12-25 NOTE — PLAN OF CARE
Problem: Musculoskeletal - Adult  Goal: Return mobility to safest level of function  Outcome: Progressing     Problem: Cardiovascular - Adult  Goal: Maintains optimal cardiac output and hemodynamic stability  Outcome: Progressing  Flowsheets (Taken 12/24/2024 2015)  Maintains optimal cardiac output and hemodynamic stability: Monitor blood pressure and heart rate     Problem: Respiratory - Adult  Goal: Achieves optimal ventilation and oxygenation  Outcome: Progressing     Problem: Safety - Adult  Goal: Free from fall injury  Outcome: Progressing     Problem: Coping  Goal: Pt/Family able to verbalize concerns and demonstrate effective coping strategies  Description: INTERVENTIONS:  1. Assist patient/family to identify coping skills, available support systems and cultural and spiritual values  2. Provide emotional support, including active listening and acknowledgement of concerns of patient and caregivers  3. Reduce environmental stimuli, as able  4. Instruct patient/family in relaxation techniques, as appropriate  5. Assess for spiritual pain/suffering and initiate Spiritual Care, Psychosocial Clinical Specialist consults as needed  Outcome: Progressing

## 2024-12-25 NOTE — PROGRESS NOTES
4 Eyes Skin Assessment     NAME:  Eleno Escobar  YOB: 1980  MEDICAL RECORD NUMBER:  [id number]    The patient is being assessed for  Other weekly    I agree that at least one RN has performed a thorough Head to Toe Skin Assessment on the patient. ALL assessment sites listed below have been assessed.      Areas assessed by both nurses:    Head, Face, Ears, Shoulders, Back, Chest, Arms, Elbows, Hands, Sacrum. Buttock, Coccyx, Ischium, Legs. Feet and Heels, and Under Medical Devices         Does the Patient have a Wound? No noted wound(s)       Sancho Prevention initiated by RN: Yes  Wound Care Orders initiated by RN: No    Pressure Injury (Stage 3,4, Unstageable, DTI, NWPT, and Complex wounds) if present, place Wound referral order by RN under : No    New Ostomies, if present place, Ostomy referral order under : No     Nurse 1 eSignature: Electronically signed by Stephenie Jones RN on 12/25/24 at 3:20 AM EST    **SHARE this note so that the co-signing nurse can place an eSignature**    Nurse 2 eSignature: Electronically signed by Ansley Byrd RN on 12/25/24 at 3:22 AM EST     Ansley Villanueva RN

## 2024-12-25 NOTE — DISCHARGE SUMMARY
tablet  Commonly known as: YESENIA     budesonide-formoterol 160-4.5 MCG/ACT Aero  Commonly known as: SYMBICORT     citalopram 20 MG tablet  Commonly known as: CELEXA     hydrOXYzine pamoate 25 MG capsule  Commonly known as: VISTARIL     metFORMIN 500 MG extended release tablet  Commonly known as: GLUCOPHAGE-XR     nebivolol 10 MG tablet  Commonly known as: BYSTOLIC     rosuvastatin 5 MG tablet  Commonly known as: CRESTOR     Trulicity 0.75 MG/0.5ML Sopn SC injection  Generic drug: dulaglutide     Vraylar 3 MG Caps capsule  Generic drug: cariprazine hcl            STOP taking these medications      losartan-hydroCHLOROthiazide 50-12.5 MG per tablet  Commonly known as: HYZAAR     ondansetron 4 MG disintegrating tablet  Commonly known as: ZOFRAN-ODT               Where to Get Your Medications        These medications were sent to [facility] [address] [place] [address] [address] [place] [address] - P [phone] - F [phone]  [address] [place] [id number]      Phone: [phone]   levoFLOXacin 750 MG tablet             DISPOSITION:    Home with Family:    x   Home with HH/PT/OT/RN:    SNF/LTC:    MYRON:    OTHER:            Code status: Full  Recommended diet: diabetic diet  Recommended activity: activity as tolerated  Wound care: None      Follow up with:   PCP : Jose Dumont III, APRN - NP  Jose Dumont III, APRN - NP  [address] [address] [phone]    Follow up in 1 week(s)  Follow-up after hospitalization for management of medications and other comorbidities.  Repeat blood work          Total time in minutes spent coordinating this discharge (includes going over instructions, follow-up, prescriptions, and preparing report for sign off to her PCP) :  35 minutes

## 2024-12-25 NOTE — PROGRESS NOTES
Discharge plan of care/case management plan validated with provider discharge order. Pt discharged home with significant other. Discharge instructions reviewed with patient. Understanding verbalized using teach-back method. All questions answered. IVs removed, pt tolerated well. Pt escorted to door by staff and left via private vehicle with all personal belongings.

## 2024-12-26 LAB
BACTERIA SPEC CULT: NORMAL
Lab: NORMAL

## 2024-12-28 LAB
BACTERIA SPEC CULT: NORMAL
BACTERIA SPEC CULT: NORMAL
Lab: NORMAL
Lab: NORMAL